# Patient Record
Sex: FEMALE | Race: WHITE | NOT HISPANIC OR LATINO | Employment: FULL TIME | URBAN - METROPOLITAN AREA
[De-identification: names, ages, dates, MRNs, and addresses within clinical notes are randomized per-mention and may not be internally consistent; named-entity substitution may affect disease eponyms.]

---

## 2017-03-18 ENCOUNTER — OFFICE VISIT (OUTPATIENT)
Dept: URGENT CARE | Facility: CLINIC | Age: 27
End: 2017-03-18
Payer: COMMERCIAL

## 2017-03-18 PROCEDURE — 99214 OFFICE O/P EST MOD 30 MIN: CPT

## 2017-03-30 ENCOUNTER — OFFICE VISIT (OUTPATIENT)
Dept: URGENT CARE | Facility: CLINIC | Age: 27
End: 2017-03-30
Payer: COMMERCIAL

## 2017-03-30 PROCEDURE — 99214 OFFICE O/P EST MOD 30 MIN: CPT

## 2018-02-26 ENCOUNTER — OFFICE VISIT (OUTPATIENT)
Dept: URGENT CARE | Facility: CLINIC | Age: 28
End: 2018-02-26
Payer: COMMERCIAL

## 2018-02-26 VITALS
HEART RATE: 96 BPM | RESPIRATION RATE: 18 BRPM | WEIGHT: 250 LBS | TEMPERATURE: 97.8 F | OXYGEN SATURATION: 99 % | DIASTOLIC BLOOD PRESSURE: 66 MMHG | HEIGHT: 69 IN | SYSTOLIC BLOOD PRESSURE: 142 MMHG | BODY MASS INDEX: 37.03 KG/M2

## 2018-02-26 DIAGNOSIS — N39.0 URINARY TRACT INFECTION WITHOUT HEMATURIA, SITE UNSPECIFIED: Primary | ICD-10-CM

## 2018-02-26 DIAGNOSIS — R35.0 FREQUENCY OF MICTURITION: ICD-10-CM

## 2018-02-26 LAB
SL AMB  POCT GLUCOSE, UA: ABNORMAL
SL AMB LEUKOCYTE ESTERASE,UA: ABNORMAL
SL AMB POCT BILIRUBIN,UA: ABNORMAL
SL AMB POCT BLOOD,UA: ABNORMAL
SL AMB POCT CLARITY,UA: CLEAR
SL AMB POCT COLOR,UA: YELLOW
SL AMB POCT KETONES,UA: ABNORMAL
SL AMB POCT NITRITE,UA: ABNORMAL
SL AMB POCT PH,UA: ABNORMAL
SL AMB POCT SPECIFIC GRAVITY,UA: 1
SL AMB POCT URINE PROTEIN: ABNORMAL
SL AMB POCT UROBILINOGEN: ABNORMAL

## 2018-02-26 PROCEDURE — 87086 URINE CULTURE/COLONY COUNT: CPT | Performed by: NURSE PRACTITIONER

## 2018-02-26 PROCEDURE — 81002 URINALYSIS NONAUTO W/O SCOPE: CPT | Performed by: NURSE PRACTITIONER

## 2018-02-26 PROCEDURE — 99213 OFFICE O/P EST LOW 20 MIN: CPT | Performed by: NURSE PRACTITIONER

## 2018-02-26 PROCEDURE — 87186 SC STD MICRODIL/AGAR DIL: CPT | Performed by: NURSE PRACTITIONER

## 2018-02-26 PROCEDURE — 87077 CULTURE AEROBIC IDENTIFY: CPT | Performed by: NURSE PRACTITIONER

## 2018-02-26 RX ORDER — SULFAMETHOXAZOLE AND TRIMETHOPRIM 800; 160 MG/1; MG/1
1 TABLET ORAL EVERY 12 HOURS SCHEDULED
Qty: 14 TABLET | Refills: 0 | Status: SHIPPED | OUTPATIENT
Start: 2018-02-26 | End: 2018-03-05

## 2018-02-26 NOTE — PATIENT INSTRUCTIONS
Dysuria   AMBULATORY CARE:   Dysuria  is trouble urinating, or pain, burning, or discomfort when you urinate  Dysuria is usually a symptom of another problem, such as a blockage or urinary tract infection  Common symptoms include the following:   · Fever     · Cloudy, bad smelling urine     · Urge to urinate often but urinating little     · Back, side, or abdominal pain     · Blood in your urine     · Discharge that smells bad     · Itching  Seek care immediately if:   · You have severe back, side, or abdominal pain  · You have fever and shaking chills  · You vomit several times in a row  Contact your healthcare provider if:   · Your symptoms do not go away, even after treatment  · You have questions or concerns about your condition or care  Treatment for dysuria  may include medicines to treat a bacterial infection or help decrease bladder spasms  Manage your dysuria:   · Drink more liquids  Liquids help flush out bacteria that may be causing an infection  Ask your healthcare provider how much liquid to drink each day and which liquids are best for you  · Take sitz baths as directed  Fill a bathtub with 4 to 6 inches of warm water  You may also use a sitz bath pan that fits over a toilet  Sit in the sitz bath for 20 minutes  Do this 2 to 3 times a day, or as directed  The warm water can help decrease pain and swelling  Follow up with your healthcare provider as directed:  Write down your questions so you remember to ask them during your visits  © 2017 [address] [employer] Information is for End User's use only and may not be sold, redistributed or otherwise used for commercial purposes  All illustrations and images included in CareNotes® are the copyrighted property of [employer] , [employer]  or [employer]  The above information is an  only  It is not intended as medical advice for individual conditions or treatments   Talk to your doctor, nurse or pharmacist before following any medical regimen to see if it is safe and effective for you

## 2018-02-26 NOTE — PROGRESS NOTES
[facility]        NAME: Maggie Britton is a 33 y o  female  : 1990    MRN: [id number]  DATE: 2018  TIME: 12:41 PM    Assessment and Plan   Urinary tract infection without hematuria, site unspecified [N39 0]  1  Urinary tract infection without hematuria, site unspecified  sulfamethoxazole-trimethoprim (BACTRIM DS) 800-160 mg per tablet   2  Frequency of micturition  POCT urine dip         Patient Instructions       Follow up with PCP in 3-5 days  Proceed to  ER if symptoms worsen  Chief Complaint     Chief Complaint   Patient presents with    Possible UTI     C/O frequency, lower abdominal pressure and pain with urination x 1 week  History of Present Illness       21-year-old female at urgent care with chief complaint of urinary frequency burning on urination and lower abdominal pain for 1 week along with urgency        Review of Systems   Review of Systems   Constitutional: Negative  HENT: Negative  Eyes: Negative  Respiratory: Negative  Cardiovascular: Negative  Gastrointestinal: Positive for abdominal pain  Endocrine: Negative  Genitourinary: Positive for dysuria and urgency  Musculoskeletal: Negative  Skin: Negative  Allergic/Immunologic: Negative  Neurological: Negative  Hematological: Negative  Psychiatric/Behavioral: Negative            Current Medications       Current Outpatient Prescriptions:     sulfamethoxazole-trimethoprim (BACTRIM DS) 800-160 mg per tablet, Take 1 tablet by mouth every 12 (twelve) hours for 7 days, Disp: 14 tablet, Rfl: 0    Current Allergies     Allergies as of 2018    (No Known Allergies)            The following portions of the patient's history were reviewed and updated as appropriate: allergies, current medications, past family history, past medical history, past social history, past surgical history and problem list      Past Medical History:   Diagnosis Date    Allergic rhinitis     Oral contraceptive use        History reviewed  No pertinent surgical history  No family history on file  Medications have been verified  Objective   /66   Pulse 96   Temp 97 8 °F (36 6 °C)   Resp 18   Ht 5' 9" (1 753 m)   Wt 113 kg (250 lb)   SpO2 99%   BMI 36 92 kg/m²        Physical Exam     Physical Exam   Constitutional: She is oriented to person, place, and time  Vital signs are normal  She appears well-developed and well-nourished  She is active and cooperative  HENT:   Head: Normocephalic and atraumatic  Right Ear: Hearing, tympanic membrane, external ear and ear canal normal    Left Ear: Hearing, tympanic membrane, external ear and ear canal normal    Nose: Nose normal    Mouth/Throat: Uvula is midline, oropharynx is clear and moist and mucous membranes are normal    Cardiovascular: Normal rate, regular rhythm and normal heart sounds  Pulmonary/Chest: Effort normal and breath sounds normal    Abdominal: Normal appearance and bowel sounds are normal  There is no hepatosplenomegaly, splenomegaly or hepatomegaly  There is no tenderness  There is no rigidity, no rebound, no guarding, no CVA tenderness, no tenderness at McBurney's point and negative Porter's sign  No hernia  Hernia confirmed negative in the ventral area, confirmed negative in the right inguinal area and confirmed negative in the left inguinal area  Musculoskeletal: Normal range of motion  Neurological: She is alert and oriented to person, place, and time  Skin: Skin is warm, dry and intact  Psychiatric: She has a normal mood and affect   Her speech is normal and behavior is normal  Judgment and thought content normal  Cognition and memory are normal

## 2018-02-28 LAB — BACTERIA UR CULT: ABNORMAL

## 2018-05-18 ENCOUNTER — OFFICE VISIT (OUTPATIENT)
Dept: URGENT CARE | Facility: CLINIC | Age: 28
End: 2018-05-18
Payer: COMMERCIAL

## 2018-05-18 VITALS
WEIGHT: 250 LBS | DIASTOLIC BLOOD PRESSURE: 76 MMHG | HEART RATE: 86 BPM | SYSTOLIC BLOOD PRESSURE: 122 MMHG | TEMPERATURE: 97.3 F | OXYGEN SATURATION: 99 % | RESPIRATION RATE: 20 BRPM | BODY MASS INDEX: 37.03 KG/M2 | HEIGHT: 69 IN

## 2018-05-18 DIAGNOSIS — R07.9 CHEST PAIN, UNSPECIFIED TYPE: Primary | ICD-10-CM

## 2018-05-18 LAB
ALBUMIN SERPL BCP-MCNC: 4.1 G/DL (ref 3.5–5.7)
ALP SERPL-CCNC: 30 IU/L (ref 40–150)
ALT SERPL W P-5'-P-CCNC: 21 IU/L (ref 0–50)
ANION GAP SERPL CALCULATED.3IONS-SCNC: 12.6 MM/L
APTT PPP: 27 SEC (ref 24.4–37.6)
AST SERPL W P-5'-P-CCNC: 14 U/L (ref 7–26)
BASOPHILS # BLD AUTO: 0 X3/UL (ref 0–0.3)
BASOPHILS # BLD AUTO: 0.5 % (ref 0–2)
BILIRUB SERPL-MCNC: 0.3 MG/DL (ref 0.3–1)
BUN SERPL-MCNC: 12 MG/DL (ref 7–25)
CALCIUM SERPL-MCNC: 9.4 MG/DL (ref 8.6–10.5)
CHLORIDE SERPL-SCNC: 107 MM/L (ref 98–107)
CO2 SERPL-SCNC: 26 MM/L (ref 21–31)
CREAT SERPL-MCNC: 0.85 MG/DL (ref 0.6–1.2)
D-DIMER QUANTITATIVE (HISTORICAL): < 150 NG/ML
DEPRECATED RDW RBC AUTO: 13.7 % (ref 11.5–14.5)
EGFR (HISTORICAL): > 60 GFR
EGFR AFRICAN AMERICAN (HISTORICAL): > 60 GFR
EOSINOPHIL # BLD AUTO: 0.1 X3/UL (ref 0–0.5)
EOSINOPHIL NFR BLD AUTO: 0.9 % (ref 0–5)
GLUCOSE (HISTORICAL): 85 MG/DL (ref 65–99)
HCT VFR BLD AUTO: 41.4 % (ref 37–47)
HGB BLD-MCNC: 13.5 G/DL (ref 12–16)
INR PPP: 0.94 (ref 0.9–1.5)
LYMPHOCYTES # BLD AUTO: 3.6 X3/UL (ref 1.2–4.2)
LYMPHOCYTES NFR BLD AUTO: 40.8 % (ref 20.5–51.1)
MCH RBC QN AUTO: 24.7 PG (ref 26–34)
MCHC RBC AUTO-ENTMCNC: 32.5 G/DL (ref 31–36)
MCV RBC AUTO: 76.1 FL (ref 81–99)
MONOCYTES # BLD AUTO: 0.5 X3/UL (ref 0–1)
MONOCYTES NFR BLD AUTO: 6 % (ref 1.7–12)
NEUTROPHILS # BLD AUTO: 4.6 X3/UL (ref 1.4–6.5)
NEUTS SEG NFR BLD AUTO: 51.8 % (ref 42.2–75.2)
OSMOLALITY, SERUM (HISTORICAL): 282 MOSM (ref 262–291)
PLATELET # BLD AUTO: 262 X3/UL (ref 130–400)
PMV BLD AUTO: 8 FL (ref 8.6–11.7)
POTASSIUM SERPL-SCNC: 3.6 MM/L (ref 3.5–5.5)
PROTHROMBIN TIME (HISTORICAL): 10.9 SEC (ref 10.1–12.9)
RBC # BLD AUTO: 5.44 X6/UL (ref 3.9–5.2)
SODIUM SERPL-SCNC: 142 MM/L (ref 134–143)
TOTAL PROTEIN (HISTORICAL): 6.5 G/DL (ref 6.4–8.9)
TROPONIN I SERPL-MCNC: < 0.03 NG/ML
WBC # BLD AUTO: 8.8 X3/UL (ref 4.8–10.8)

## 2018-05-18 PROCEDURE — 99213 OFFICE O/P EST LOW 20 MIN: CPT | Performed by: PHYSICIAN ASSISTANT

## 2018-05-18 NOTE — PATIENT INSTRUCTIONS
Go directly to [facility] emergency room for further evaluation

## 2018-05-18 NOTE — PROGRESS NOTES
[facility]        NAME: Parag Avila is a 33 y o  female  : 1990    MRN: [id number]  DATE: May 18, 2018  TIME: 11:25 AM    Assessment and Plan   Chest pain, unspecified type [R07 9]  1  Chest pain, unspecified type           Patient Instructions     Go to [facility] ER for further evaluation  Chief Complaint     Chief Complaint   Patient presents with    Chest Pain     C/O chest pain, tight in the chest and SOB x 8-9 days  Pt was seen 6 days ago in an Urgent Care and was treated with a Z-PREETHI and Prednisone without relief  Pt is on oral cotraceptives and denies any long distance travel  History of Present Illness       Patient was treated with a Z-Preethi and prednisone approximately 9 days ago for respiratory issues she is having continued shortness of breath on exertion and is now complaining of chest pain  Denies any fever or chills edema of lower extremities or calf pain  Patient does take oral contraceptives  Review of Systems   Review of Systems   Constitutional: Negative for chills and fever  HENT: Negative for congestion, postnasal drip, rhinorrhea, sinus pressure, sore throat and trouble swallowing  Eyes: Negative for redness  Respiratory: Positive for shortness of breath (on exertion)  Negative for cough, chest tightness and wheezing  Cardiovascular: Positive for chest pain  Negative for palpitations  Gastrointestinal: Negative for abdominal pain, diarrhea, nausea and vomiting  Musculoskeletal: Negative for myalgias  Skin: Negative for rash  Neurological: Negative for dizziness and headaches  Hematological: Negative for adenopathy  Current Medications     No current outpatient prescriptions on file      Current Allergies     Allergies as of 2018    (No Known Allergies)            The following portions of the patient's history were reviewed and updated as appropriate: allergies, current medications, past family history, past medical history, past social history, past surgical history and problem list      Past Medical History:   Diagnosis Date    Allergic rhinitis     Oral contraceptive use        History reviewed  No pertinent surgical history  No family history on file  Medications have been verified  Objective   /76   Pulse 86   Temp (!) 97 3 °F (36 3 °C) (Tympanic)   Resp 20   Ht 5' 9" (1 753 m)   Wt 113 kg (250 lb)   SpO2 99%   BMI 36 92 kg/m²        Physical Exam     Physical Exam   Constitutional: She is oriented to person, place, and time  She appears well-developed and well-nourished  HENT:   Head: Normocephalic and atraumatic  Right Ear: External ear normal    Left Ear: External ear normal    Nose: Nose normal    Mouth/Throat: Oropharynx is clear and moist    Eyes: Conjunctivae are normal    Neck: Neck supple  Cardiovascular: Normal rate, regular rhythm and normal heart sounds  Pulmonary/Chest: Effort normal and breath sounds normal    Musculoskeletal: Normal range of motion  Lymphadenopathy:     She has no cervical adenopathy  Neurological: She is alert and oriented to person, place, and time  Skin: Skin is warm and dry  No rash noted  Psychiatric: She has a normal mood and affect  Her behavior is normal  Judgment and thought content normal    Nursing note and vitals reviewed  Patient was ambulated with pulse oximeter on her pulse went from  and her oxygen saturation went from 100% down to 93% on room air  This in conjunction with a history of the oral birth control pills cannot rule out a pulmonary embolus patient was sent to the emergency room for further evaluation  Patient was advised that this would be a pulmonary embolus difficult and treated the embolus could get increase in size and lead to add on timely death  Patient did verbalized her understanding

## 2018-05-29 RX ORDER — DESOG-E.ESTRADIOL/E.ESTRADIOL 21-5 (28)
1 TABLET ORAL DAILY
COMMUNITY
Start: 2018-03-10 | End: 2018-09-19 | Stop reason: CLARIF

## 2018-05-30 ENCOUNTER — OFFICE VISIT (OUTPATIENT)
Dept: FAMILY MEDICINE CLINIC | Facility: CLINIC | Age: 28
End: 2018-05-30
Payer: COMMERCIAL

## 2018-05-30 VITALS
HEIGHT: 67 IN | TEMPERATURE: 99.8 F | WEIGHT: 274 LBS | HEART RATE: 100 BPM | OXYGEN SATURATION: 98 % | SYSTOLIC BLOOD PRESSURE: 100 MMHG | RESPIRATION RATE: 20 BRPM | BODY MASS INDEX: 43 KG/M2 | DIASTOLIC BLOOD PRESSURE: 72 MMHG

## 2018-05-30 DIAGNOSIS — Z13.220 SCREENING FOR HYPERLIPIDEMIA: ICD-10-CM

## 2018-05-30 DIAGNOSIS — Z00.00 ENCOUNTER FOR PREVENTATIVE ADULT HEALTH CARE EXAMINATION: ICD-10-CM

## 2018-05-30 DIAGNOSIS — M94.0 COSTOCHONDRITIS: Primary | ICD-10-CM

## 2018-05-30 DIAGNOSIS — E55.9 VITAMIN D DEFICIENCY: ICD-10-CM

## 2018-05-30 DIAGNOSIS — R53.83 OTHER FATIGUE: ICD-10-CM

## 2018-05-30 DIAGNOSIS — R06.00 DYSPNEA ON EXERTION: ICD-10-CM

## 2018-05-30 DIAGNOSIS — J30.9 ALLERGIC RHINITIS, UNSPECIFIED SEASONALITY, UNSPECIFIED TRIGGER: ICD-10-CM

## 2018-05-30 PROBLEM — R06.09 DYSPNEA ON EXERTION: Status: ACTIVE | Noted: 2018-05-30

## 2018-05-30 PROBLEM — J01.10 ACUTE FRONTAL SINUSITIS: Status: ACTIVE | Noted: 2017-03-30

## 2018-05-30 PROCEDURE — 99204 OFFICE O/P NEW MOD 45 MIN: CPT | Performed by: NURSE PRACTITIONER

## 2018-05-30 RX ORDER — NORETHINDRONE ACETATE AND ETHINYL ESTRADIOL AND FERROUS FUMARATE 1MG-20(24)
KIT ORAL
Refills: 0 | Status: CANCELLED | OUTPATIENT
Start: 2018-05-30

## 2018-05-30 RX ORDER — PREDNISONE 20 MG/1
40 TABLET ORAL
COMMUNITY
Start: 2018-05-14 | End: 2018-05-30 | Stop reason: ALTCHOICE

## 2018-05-30 RX ORDER — NORETHINDRONE ACETATE AND ETHINYL ESTRADIOL AND FERROUS FUMARATE 1MG-20(24)
KIT ORAL
COMMUNITY
Start: 2018-05-21 | End: 2018-05-30 | Stop reason: ALTCHOICE

## 2018-05-30 RX ORDER — IBUPROFEN 600 MG/1
TABLET ORAL
Refills: 2 | COMMUNITY
Start: 2018-05-18 | End: 2018-09-19 | Stop reason: ALTCHOICE

## 2018-05-30 NOTE — PROGRESS NOTES
Assessment/Plan:    No problem-specific Assessment & Plan notes found for this encounter  Diagnoses and all orders for this visit:    Allergic rhinitis, unspecified seasonality, unspecified trigger    Other orders  -     Nichol Rohit 0 15-0 02/0 01 MG (21/5) per tablet;   -     ibuprofen (MOTRIN) 600 mg tablet; TAKE 1 TABLET BY MOUTH 2 TO 3 TIMES A DAY  -     Discontinue: MELODETTA 24 FE 1-20 MG-MCG(24) CHEW;   -     Discontinue: predniSONE 20 mg tablet; Take 40 mg by mouth          Subjective:      Patient ID: Talat Abraham is a 33 y o  female  presents with c/o chest pain, was seen in [place] [address] and then subsequently sent to [facility] for chest pain and diagnosed with costocondritis 5/18/18  Pt reports midsternal reproduceable pain is much improved today with use of antiinflammatory medication     Pt denies cough, but does have low grade fever today  HPI    The following portions of the patient's history were reviewed and updated as appropriate:   She  has a past medical history of Allergic rhinitis; Obesity; and Oral contraceptive use  She   Patient Active Problem List    Diagnosis Date Noted    Allergic rhinitis 05/30/2018    Costochondritis 05/30/2018    Acute frontal sinusitis 03/30/2017     She  has a past surgical history that includes [place] tooth extraction  Her family history includes Diabetes in her father; Hypertension in her mother  She  reports that she has never smoked  She has never used smokeless tobacco  She reports that she drinks alcohol  She reports that she does not use drugs  Current Outpatient Prescriptions   Medication Sig Dispense Refill    ibuprofen (MOTRIN) 600 mg tablet TAKE 1 TABLET BY MOUTH 2 TO 3 TIMES A DAY  2    KARIVA 0 15-0 02/0 01 MG (21/5) per tablet        No current facility-administered medications for this visit  No current outpatient prescriptions on file prior to visit  No current facility-administered medications on file prior to visit        She has No Known Allergies       Review of Systems   Constitutional: Negative for fatigue  HENT: Negative for congestion, postnasal drip, rhinorrhea, sinus pain, sore throat and trouble swallowing  Eyes: Negative for pain and visual disturbance  Respiratory: Negative for cough, shortness of breath and wheezing  Cardiovascular: Negative for chest pain and palpitations  Gastrointestinal: Negative for constipation, diarrhea, nausea and vomiting  Endocrine: Negative for polydipsia, polyphagia and polyuria  Genitourinary: Negative for difficulty urinating, flank pain, frequency and pelvic pain  Musculoskeletal: Negative for arthralgias, back pain, joint swelling and myalgias  Skin: Negative  Negative for color change and rash  Neurological: Negative for dizziness, syncope, weakness, light-headedness, numbness and headaches  Hematological: Negative for adenopathy  Does not bruise/bleed easily  Psychiatric/Behavioral: Negative for behavioral problems and sleep disturbance  The patient is not nervous/anxious  Objective:      /72 (BP Location: Left arm, Patient Position: Sitting, Cuff Size: Large)   Pulse 100   Temp 100 1 °F (37 8 °C) (Tympanic)   Resp 20   Ht 5' 7 25" (1 708 m)   Wt 124 kg (274 lb)   LMP 05/04/2018 (Exact Date)   SpO2 98%   BMI 42 60 kg/m²          Physical Exam   Constitutional: She is oriented to person, place, and time  She appears well-developed and well-nourished  HENT:   Head: Normocephalic  Eyes: Pupils are equal, round, and reactive to light  Neck: Normal range of motion  Cardiovascular: Normal rate and regular rhythm  Pulmonary/Chest: Effort normal and breath sounds normal    Abdominal: Soft  Bowel sounds are normal    Musculoskeletal: Normal range of motion  Neurological: She is alert and oriented to person, place, and time  Skin: Skin is warm and dry  Psychiatric: She has a normal mood and affect   Her behavior is normal  Judgment and thought content normal    Nursing note and vitals reviewed

## 2018-05-30 NOTE — PATIENT INSTRUCTIONS
Costochondritis   WHAT YOU NEED TO KNOW:   Costochondritis is a condition that causes pain in the cartilage that connect your ribs to your sternum (breastbone)  Cartilage is the tough, bendable tissue that protects your bones  DISCHARGE INSTRUCTIONS:   Medicines:   · Acetaminophen: This medicine decreases pain  Acetaminophen is available without a doctor's order  Ask how much to take and how often to take it  Follow directions  Acetaminophen can cause liver damage if not taken correctly  · NSAIDs , such as ibuprofen, help decrease swelling, pain, and fever  This medicine is available with or without a doctor's order  NSAIDs can cause stomach bleeding or kidney problems in certain people  If you take blood thinner medicine, always ask if NSAIDs are safe for you  Always read the medicine label and follow directions  Do not give these medicines to children under 10months of age without direction from your child's healthcare provider  · Take your medicine as directed  Contact your healthcare provider if you think your medicine is not helping or if you have side effects  Tell him of her if you are allergic to any medicine  Keep a list of the medicines, vitamins, and herbs you take  Include the amounts, and when and why you take them  Bring the list or the pill bottles to follow-up visits  Carry your medicine list with you in case of an emergency  Follow up with your healthcare provider as directed:  Write down your questions so you remember to ask them during your visits  Rest:  You may need to get more rest  Learn which movements and activities cause pain, and avoid doing them  Do not carry objects, such as a purse or backpack, if this is painful  Avoid activities such as rowing and weightlifting until your pain decreases or goes away  Ask which activities are best for you to do while you recover  Heat:  Heat helps decrease pain in some patients   Apply heat on the area for 20 to 30 minutes every 2 hours for as many days as directed  Ice:  Ice helps decrease swelling and pain  Ice may also help prevent tissue damage  Use an ice pack, or put crushed ice in a plastic bag  Cover it with a towel and place it on the painful area for 15 to 20 minutes every hour or as directed  Stretching exercises:  Gentle stretching may help your symptoms   a doorway and put your hands on the door frame at the level of your ears or shoulders  Take 1 step forward and gently stretch your chest  Try this with your hands higher up on the doorway  Contact your healthcare provider if:   · You have a fever  · The painful areas of your chest look swollen, red, and feel warm to the touch  · You cannot sleep because of the pain  · You have questions or concerns about your condition or care  © 2017 [employer] [employer]  Information is for End User's use only and may not be sold, redistributed or otherwise used for commercial purposes  All illustrations and images included in CareNotes® are the copyrighted property of [employer]  or [address]  The above information is an  only  It is not intended as medical advice for individual conditions or treatments  Talk to your doctor, nurse or pharmacist before following any medical regimen to see if it is safe and effective for you

## 2018-06-11 ENCOUNTER — TRANSCRIBE ORDERS (OUTPATIENT)
Dept: LAB | Facility: CLINIC | Age: 28
End: 2018-06-11

## 2018-06-11 ENCOUNTER — APPOINTMENT (OUTPATIENT)
Dept: LAB | Facility: CLINIC | Age: 28
End: 2018-06-11
Payer: COMMERCIAL

## 2018-06-11 DIAGNOSIS — R06.00 DYSPNEA ON EXERTION: ICD-10-CM

## 2018-06-11 DIAGNOSIS — Z13.220 SCREENING FOR HYPERLIPIDEMIA: ICD-10-CM

## 2018-06-11 DIAGNOSIS — R53.83 OTHER FATIGUE: ICD-10-CM

## 2018-06-11 DIAGNOSIS — E55.9 VITAMIN D DEFICIENCY: ICD-10-CM

## 2018-06-11 LAB
25(OH)D3 SERPL-MCNC: 9.8 NG/ML (ref 30–100)
BACTERIA UR QL AUTO: ABNORMAL /HPF
BILIRUB UR QL STRIP: NEGATIVE
CHOLEST SERPL-MCNC: 191 MG/DL (ref 50–200)
CLARITY UR: ABNORMAL
COLOR UR: YELLOW
GLUCOSE UR STRIP-MCNC: NEGATIVE MG/DL
HDLC SERPL-MCNC: 54 MG/DL (ref 40–60)
HGB UR QL STRIP.AUTO: NEGATIVE
HYALINE CASTS #/AREA URNS LPF: ABNORMAL /LPF
KETONES UR STRIP-MCNC: ABNORMAL MG/DL
LDLC SERPL CALC-MCNC: 95 MG/DL (ref 0–100)
LEUKOCYTE ESTERASE UR QL STRIP: ABNORMAL
NITRITE UR QL STRIP: NEGATIVE
NON-SQ EPI CELLS URNS QL MICRO: ABNORMAL /HPF
NONHDLC SERPL-MCNC: 137 MG/DL
PH UR STRIP.AUTO: 6 [PH] (ref 4.5–8)
PROT UR STRIP-MCNC: NEGATIVE MG/DL
RBC #/AREA URNS AUTO: ABNORMAL /HPF
SP GR UR STRIP.AUTO: 1.01 (ref 1–1.03)
TRIGL SERPL-MCNC: 210 MG/DL
TSH SERPL DL<=0.05 MIU/L-ACNC: 0.87 UIU/ML (ref 0.36–3.74)
UROBILINOGEN UR QL STRIP.AUTO: 0.2 E.U./DL
WBC #/AREA URNS AUTO: ABNORMAL /HPF

## 2018-06-11 PROCEDURE — 84443 ASSAY THYROID STIM HORMONE: CPT

## 2018-06-11 PROCEDURE — 80061 LIPID PANEL: CPT

## 2018-06-11 PROCEDURE — 82306 VITAMIN D 25 HYDROXY: CPT

## 2018-06-11 PROCEDURE — 36415 COLL VENOUS BLD VENIPUNCTURE: CPT

## 2018-06-11 PROCEDURE — 81001 URINALYSIS AUTO W/SCOPE: CPT | Performed by: NURSE PRACTITIONER

## 2018-06-13 ENCOUNTER — TRANSCRIBE ORDERS (OUTPATIENT)
Dept: LAB | Facility: CLINIC | Age: 28
End: 2018-06-13

## 2018-06-13 ENCOUNTER — APPOINTMENT (OUTPATIENT)
Dept: LAB | Facility: CLINIC | Age: 28
End: 2018-06-13
Payer: COMMERCIAL

## 2018-06-13 ENCOUNTER — OFFICE VISIT (OUTPATIENT)
Dept: FAMILY MEDICINE CLINIC | Facility: CLINIC | Age: 28
End: 2018-06-13
Payer: COMMERCIAL

## 2018-06-13 VITALS
WEIGHT: 273.8 LBS | HEIGHT: 67 IN | RESPIRATION RATE: 20 BRPM | TEMPERATURE: 99.4 F | DIASTOLIC BLOOD PRESSURE: 74 MMHG | BODY MASS INDEX: 42.97 KG/M2 | SYSTOLIC BLOOD PRESSURE: 100 MMHG | HEART RATE: 100 BPM

## 2018-06-13 DIAGNOSIS — L65.9 HAIR LOSS: ICD-10-CM

## 2018-06-13 DIAGNOSIS — M94.0 COSTOCHONDRITIS: Primary | ICD-10-CM

## 2018-06-13 DIAGNOSIS — D56.9 THALASSEMIA, UNSPECIFIED TYPE: ICD-10-CM

## 2018-06-13 DIAGNOSIS — E55.9 VITAMIN D DEFICIENCY: ICD-10-CM

## 2018-06-13 DIAGNOSIS — N92.6 IRREGULAR MENSES: ICD-10-CM

## 2018-06-13 DIAGNOSIS — R06.00 DYSPNEA ON EXERTION: ICD-10-CM

## 2018-06-13 DIAGNOSIS — R73.9 HYPERGLYCEMIA: ICD-10-CM

## 2018-06-13 DIAGNOSIS — N39.0 URINARY TRACT INFECTION WITHOUT HEMATURIA, SITE UNSPECIFIED: ICD-10-CM

## 2018-06-13 DIAGNOSIS — E28.2 PCOS (POLYCYSTIC OVARIAN SYNDROME): ICD-10-CM

## 2018-06-13 DIAGNOSIS — A74.9 CHLAMYDIAL INFECTION: ICD-10-CM

## 2018-06-13 DIAGNOSIS — E78.2 MIXED HYPERLIPIDEMIA: ICD-10-CM

## 2018-06-13 LAB
BILIRUB UR QL STRIP: NEGATIVE
CLARITY UR: CLEAR
COLOR UR: YELLOW
GLUCOSE UR STRIP-MCNC: NEGATIVE MG/DL
HGB UR QL STRIP.AUTO: NEGATIVE
KETONES UR STRIP-MCNC: NEGATIVE MG/DL
LEUKOCYTE ESTERASE UR QL STRIP: NEGATIVE
NITRITE UR QL STRIP: NEGATIVE
PH UR STRIP.AUTO: 6 [PH] (ref 4.5–8)
PROT UR STRIP-MCNC: NEGATIVE MG/DL
SP GR UR STRIP.AUTO: 1.02 (ref 1–1.03)
UROBILINOGEN UR QL STRIP.AUTO: 0.2 E.U./DL

## 2018-06-13 PROCEDURE — 81003 URINALYSIS AUTO W/O SCOPE: CPT | Performed by: NURSE PRACTITIONER

## 2018-06-13 PROCEDURE — 99214 OFFICE O/P EST MOD 30 MIN: CPT | Performed by: NURSE PRACTITIONER

## 2018-06-13 RX ORDER — ERGOCALCIFEROL 1.25 MG/1
50000 CAPSULE ORAL WEEKLY
Qty: 12 CAPSULE | Refills: 1 | Status: SHIPPED | OUTPATIENT
Start: 2018-06-13 | End: 2018-11-24 | Stop reason: SDUPTHER

## 2018-06-13 NOTE — PROGRESS NOTES
Assessment/Plan:    No problem-specific Assessment & Plan notes found for this encounter  Diagnoses and all orders for this visit:    Costochondritis  Comments:  Pt reports its resolved    Dyspnea on exertion  Comments:  Resolved  Orders:  -     TIBC; Future  -     Vitamin B12; Future  -     Ferritin; Future    Vitamin D deficiency  Comments:  Vitamin D 9 8 Rx for vitamin D provided  Orders:  -     ergocalciferol (VITAMIN D2) 50,000 units; Take 1 capsule (50,000 Units total) by mouth once a week    Mixed hyperlipidemia  Comments:  Triglycerides 215 additional labwork ordered  Orders:  -     Comprehensive metabolic panel    Irregular menses  Comments:  US abd and pelvis    Orders:  -     CBC and differential; Future  -     Vitamin B12; Future  -     Ferritin; Future  -     US abdomen and pelvis with transvaginal; Future  -     Prolactin; Future  -     Androstenedione; Future  -     Cortisol Level, AM Specimen; Future    PCOS (polycystic ovarian syndrome)  Comments:  labwork ordere, US abd pelvis  Orders:  -     CBC and differential; Future  -     Vitamin B12; Future  -     Ferritin; Future  -     US abdomen and pelvis with transvaginal; Future  -     Prolactin; Future  -     Androstenedione; Future    Chlamydial infection  Comments:  Repeat test of cure ordered for 3 mos, pt and her boyfriend have been treated with antibiotic by GYN  Orders:  -     US abdomen and pelvis with transvaginal; Future  -     Chlamydia/GC amplified DNA by PCR; Future    Urinary tract infection without hematuria, site unspecified  Comments:  UA negative however urine culture positive for Klebseilla pneumonia  Orders:  -     UA w Reflex to Microscopic w Reflex to Culture  -     UA w Reflex to Microscopic w Reflex to Culture    Hyperglycemia  Comments:  Labwork ordered, awaiting [facility] records [facility]  Orders:  -     C-peptide;  Future    Hair loss  Comments:  Labwork ordered, awaiting health records from [facility]  Orders:  -     Cortisol Level, AM Specimen; Future    Thalassemia, unspecified type  Comments:  Pt has family hx of Thalasemia,Labwork ordered, awaiting health records from [facility]  Orders:  -     Thalassemia and Hemoglobinopathy Comp; Future          Subjective:      Patient ID: Talat Abraham is a 33 y o  female here for f/u chostonchondritis -pt reports she is feeling much better, but still feels fatigued has irregular menses, difficulty losing weight, BMI 42 88 hair loss  Will assess for PCOS  Urine culture returned positive for Klebsiella in the setting of negative UA  Will repeat  Pt also reports she has been dx with Chlamydia and was treated        HPI    The following portions of the patient's history were reviewed and updated as appropriate:   She  has a past medical history of Allergic rhinitis; Obesity; and Oral contraceptive use  She   Patient Active Problem List    Diagnosis Date Noted    Allergic rhinitis 05/30/2018    Costochondritis 05/30/2018    Dyspnea on exertion 05/30/2018    Acute frontal sinusitis 03/30/2017     She  has a past surgical history that includes [place] tooth extraction  Her family history includes Diabetes in her father; Hypertension in her mother  She  reports that she has never smoked  She has never used smokeless tobacco  She reports that she drinks alcohol  She reports that she does not use drugs  Current Outpatient Prescriptions   Medication Sig Dispense Refill    ibuprofen (MOTRIN) 600 mg tablet TAKE 1 TABLET BY MOUTH 2 TO 3 TIMES A DAY  2    KARIVA 0 15-0 02/0 01 MG (21/5) per tablet Take 1 tablet by mouth daily        ergocalciferol (VITAMIN D2) 50,000 units Take 1 capsule (50,000 Units total) by mouth once a week 12 capsule 1     No current facility-administered medications for this visit        Current Outpatient Prescriptions on File Prior to Visit   Medication Sig    ibuprofen (MOTRIN) 600 mg tablet TAKE 1 TABLET BY MOUTH 2 TO 3 TIMES A DAY    KARIVA 0 15-0 02/0 01 MG (21/5) per tablet Take 1 tablet by mouth daily       No current facility-administered medications on file prior to visit  She has No Known Allergies       Review of Systems   Constitutional: Positive for fatigue  Hair loss     HENT: Negative for congestion, postnasal drip, rhinorrhea, sinus pain, sore throat and trouble swallowing  Eyes: Negative for pain and visual disturbance  Respiratory: Negative for cough, shortness of breath and wheezing  Cardiovascular: Negative for chest pain and palpitations  Gastrointestinal: Negative for constipation, diarrhea, nausea and vomiting  Endocrine: Negative for polydipsia, polyphagia and polyuria  Genitourinary: Positive for menstrual problem  Negative for difficulty urinating, flank pain, frequency and pelvic pain  Musculoskeletal: Negative for arthralgias, back pain, joint swelling and myalgias  Skin: Negative  Negative for color change and rash  Neurological: Negative for dizziness, syncope, weakness, light-headedness, numbness and headaches  Hematological: Negative for adenopathy  Does not bruise/bleed easily  Psychiatric/Behavioral: Negative for behavioral problems and sleep disturbance  The patient is not nervous/anxious  Objective:      /74 (BP Location: Left arm, Patient Position: Sitting, Cuff Size: Large)   Pulse 100   Temp 99 4 °F (37 4 °C) (Tympanic)   Resp 20   Ht 5' 7" (1 702 m)   Wt 124 kg (273 lb 12 8 oz)   LMP 05/23/2018 (Exact Date)   BMI 42 88 kg/m²          Physical Exam   Constitutional: She is oriented to person, place, and time  She appears well-developed and well-nourished  HENT:   Head: Normocephalic  Eyes: Pupils are equal, round, and reactive to light  Neck: Normal range of motion  Cardiovascular: Normal rate and regular rhythm  Pulmonary/Chest: Effort normal and breath sounds normal    Abdominal: Soft  Bowel sounds are normal    Musculoskeletal: Normal range of motion  Neurological: She is alert and oriented to person, place, and time  Skin: Skin is warm and dry  Psychiatric: She has a normal mood and affect  Her behavior is normal  Judgment and thought content normal    Nursing note and vitals reviewed

## 2018-07-10 ENCOUNTER — APPOINTMENT (OUTPATIENT)
Dept: LAB | Facility: CLINIC | Age: 28
End: 2018-07-10
Payer: COMMERCIAL

## 2018-07-10 ENCOUNTER — TRANSCRIBE ORDERS (OUTPATIENT)
Dept: LAB | Facility: CLINIC | Age: 28
End: 2018-07-10

## 2018-07-10 DIAGNOSIS — R06.00 DYSPNEA ON EXERTION: ICD-10-CM

## 2018-07-10 DIAGNOSIS — E28.2 PCOS (POLYCYSTIC OVARIAN SYNDROME): ICD-10-CM

## 2018-07-10 DIAGNOSIS — D56.9 THALASSEMIA SYNDROME: Primary | ICD-10-CM

## 2018-07-10 DIAGNOSIS — N92.6 IRREGULAR MENSES: ICD-10-CM

## 2018-07-10 DIAGNOSIS — R73.9 HYPERGLYCEMIA: ICD-10-CM

## 2018-07-10 DIAGNOSIS — A74.9 CHLAMYDIAL INFECTION: ICD-10-CM

## 2018-07-10 DIAGNOSIS — D56.9 THALASSEMIA, UNSPECIFIED TYPE: ICD-10-CM

## 2018-07-10 DIAGNOSIS — L65.9 HAIR LOSS: ICD-10-CM

## 2018-07-10 LAB
ALBUMIN SERPL BCP-MCNC: 3.8 G/DL (ref 3.5–5)
ALP SERPL-CCNC: 39 U/L (ref 46–116)
ALT SERPL W P-5'-P-CCNC: 24 U/L (ref 12–78)
ANION GAP SERPL CALCULATED.3IONS-SCNC: 8 MMOL/L (ref 4–13)
AST SERPL W P-5'-P-CCNC: 19 U/L (ref 5–45)
BASOPHILS # BLD AUTO: 0.04 THOUSANDS/ΜL (ref 0–0.1)
BASOPHILS NFR BLD AUTO: 1 % (ref 0–1)
BILIRUB SERPL-MCNC: 0.59 MG/DL (ref 0.2–1)
BILIRUB UR QL STRIP: NEGATIVE
BUN SERPL-MCNC: 10 MG/DL (ref 5–25)
CALCIUM SERPL-MCNC: 9.2 MG/DL (ref 8.3–10.1)
CHLORIDE SERPL-SCNC: 106 MMOL/L (ref 100–108)
CLARITY UR: CLEAR
CO2 SERPL-SCNC: 25 MMOL/L (ref 21–32)
COLOR UR: YELLOW
CORTIS AM PEAK SERPL-MCNC: 18.1 UG/DL (ref 4.2–22.4)
CREAT SERPL-MCNC: 0.83 MG/DL (ref 0.6–1.3)
EOSINOPHIL # BLD AUTO: 0.14 THOUSAND/ΜL (ref 0–0.61)
EOSINOPHIL NFR BLD AUTO: 3 % (ref 0–6)
ERYTHROCYTE [DISTWIDTH] IN BLOOD BY AUTOMATED COUNT: 13.8 % (ref 11.6–15.1)
FERRITIN SERPL-MCNC: 36 NG/ML (ref 8–388)
GFR SERPL CREATININE-BSD FRML MDRD: 97 ML/MIN/1.73SQ M
GLUCOSE P FAST SERPL-MCNC: 76 MG/DL (ref 65–99)
GLUCOSE UR STRIP-MCNC: NEGATIVE MG/DL
HCT VFR BLD AUTO: 44.4 % (ref 34.8–46.1)
HGB BLD-MCNC: 13.6 G/DL (ref 11.5–15.4)
HGB UR QL STRIP.AUTO: NEGATIVE
IMM GRANULOCYTES # BLD AUTO: 0.01 THOUSAND/UL (ref 0–0.2)
IMM GRANULOCYTES NFR BLD AUTO: 0 % (ref 0–2)
KETONES UR STRIP-MCNC: NEGATIVE MG/DL
LEUKOCYTE ESTERASE UR QL STRIP: NEGATIVE
LYMPHOCYTES # BLD AUTO: 2.29 THOUSANDS/ΜL (ref 0.6–4.47)
LYMPHOCYTES NFR BLD AUTO: 41 % (ref 14–44)
MCH RBC QN AUTO: 25 PG (ref 26.8–34.3)
MCHC RBC AUTO-ENTMCNC: 30.6 G/DL (ref 31.4–37.4)
MCV RBC AUTO: 82 FL (ref 82–98)
MONOCYTES # BLD AUTO: 0.46 THOUSAND/ΜL (ref 0.17–1.22)
MONOCYTES NFR BLD AUTO: 8 % (ref 4–12)
NEUTROPHILS # BLD AUTO: 2.65 THOUSANDS/ΜL (ref 1.85–7.62)
NEUTS SEG NFR BLD AUTO: 47 % (ref 43–75)
NITRITE UR QL STRIP: NEGATIVE
NRBC BLD AUTO-RTO: 0 /100 WBCS
PH UR STRIP.AUTO: 6 [PH] (ref 4.5–8)
PLATELET # BLD AUTO: 267 THOUSANDS/UL (ref 149–390)
PMV BLD AUTO: 10.6 FL (ref 8.9–12.7)
POTASSIUM SERPL-SCNC: 3.9 MMOL/L (ref 3.5–5.3)
PROLACTIN SERPL-MCNC: 18.6 NG/ML
PROT SERPL-MCNC: 7.4 G/DL (ref 6.4–8.2)
PROT UR STRIP-MCNC: NEGATIVE MG/DL
RBC # BLD AUTO: 5.43 MILLION/UL (ref 3.81–5.12)
SODIUM SERPL-SCNC: 139 MMOL/L (ref 136–145)
SP GR UR STRIP.AUTO: 1.01 (ref 1–1.03)
TIBC SERPL-MCNC: 382 UG/DL (ref 250–450)
UROBILINOGEN UR QL STRIP.AUTO: 0.2 E.U./DL
VIT B12 SERPL-MCNC: 296 PG/ML (ref 100–900)
WBC # BLD AUTO: 5.59 THOUSAND/UL (ref 4.31–10.16)

## 2018-07-10 PROCEDURE — 85025 COMPLETE CBC W/AUTO DIFF WBC: CPT

## 2018-07-10 PROCEDURE — 82533 TOTAL CORTISOL: CPT

## 2018-07-10 PROCEDURE — 83550 IRON BINDING TEST: CPT

## 2018-07-10 PROCEDURE — 87591 N.GONORRHOEAE DNA AMP PROB: CPT

## 2018-07-10 PROCEDURE — 82157 ASSAY OF ANDROSTENEDIONE: CPT

## 2018-07-10 PROCEDURE — 84146 ASSAY OF PROLACTIN: CPT

## 2018-07-10 PROCEDURE — 83020 HEMOGLOBIN ELECTROPHORESIS: CPT

## 2018-07-10 PROCEDURE — 84681 ASSAY OF C-PEPTIDE: CPT

## 2018-07-10 PROCEDURE — 82728 ASSAY OF FERRITIN: CPT

## 2018-07-10 PROCEDURE — 81003 URINALYSIS AUTO W/O SCOPE: CPT | Performed by: NURSE PRACTITIONER

## 2018-07-10 PROCEDURE — 82607 VITAMIN B-12: CPT

## 2018-07-10 PROCEDURE — 87491 CHLMYD TRACH DNA AMP PROBE: CPT

## 2018-07-10 PROCEDURE — 36415 COLL VENOUS BLD VENIPUNCTURE: CPT | Performed by: NURSE PRACTITIONER

## 2018-07-10 PROCEDURE — 80053 COMPREHEN METABOLIC PANEL: CPT | Performed by: NURSE PRACTITIONER

## 2018-07-11 LAB
CHLAMYDIA DNA CVX QL NAA+PROBE: NORMAL
N GONORRHOEA DNA GENITAL QL NAA+PROBE: NORMAL

## 2018-07-12 LAB
ANDROST SERPL-MCNC: 64 NG/DL (ref 41–262)
C PEPTIDE SERPL-MCNC: 3.4 NG/ML (ref 1.1–4.4)
DEPRECATED HGB OTHER BLD-IMP: 0 %
HGB A MFR BLD: 2.4 % (ref 1.8–3.2)
HGB A MFR BLD: 97.6 % (ref 96.4–98.8)
HGB C MFR BLD: 0 %
HGB F MFR BLD: 0 % (ref 0–2)
HGB FRACT BLD-IMP: NORMAL
HGB S BLD QL SOLY: NEGATIVE
HGB S MFR BLD: 0 %

## 2018-07-14 ENCOUNTER — HOSPITAL ENCOUNTER (OUTPATIENT)
Dept: ULTRASOUND IMAGING | Facility: HOSPITAL | Age: 28
Discharge: HOME | End: 2018-07-14
Payer: COMMERCIAL

## 2018-07-14 DIAGNOSIS — N92.6 IRREGULAR MENSES: ICD-10-CM

## 2018-07-14 DIAGNOSIS — E28.2 PCOS (POLYCYSTIC OVARIAN SYNDROME): ICD-10-CM

## 2018-07-14 DIAGNOSIS — A74.9 CHLAMYDIAL INFECTION: ICD-10-CM

## 2018-07-14 PROCEDURE — 76856 US EXAM PELVIC COMPLETE: CPT

## 2018-07-14 PROCEDURE — 76830 TRANSVAGINAL US NON-OB: CPT

## 2018-09-17 RX ORDER — NORETHINDRONE ACETATE AND ETHINYL ESTRADIOL AND FERROUS FUMARATE 1MG-20(24)
1 KIT ORAL DAILY
Refills: 12 | COMMUNITY
Start: 2018-09-11 | End: 2020-05-26 | Stop reason: SDUPTHER

## 2018-09-19 ENCOUNTER — OFFICE VISIT (OUTPATIENT)
Dept: FAMILY MEDICINE CLINIC | Facility: CLINIC | Age: 28
End: 2018-09-19
Payer: COMMERCIAL

## 2018-09-19 VITALS
HEIGHT: 67 IN | HEART RATE: 80 BPM | BODY MASS INDEX: 43.32 KG/M2 | DIASTOLIC BLOOD PRESSURE: 78 MMHG | RESPIRATION RATE: 16 BRPM | TEMPERATURE: 97.4 F | WEIGHT: 276 LBS | SYSTOLIC BLOOD PRESSURE: 110 MMHG

## 2018-09-19 DIAGNOSIS — J30.9 ALLERGIC RHINITIS, UNSPECIFIED SEASONALITY, UNSPECIFIED TRIGGER: Primary | ICD-10-CM

## 2018-09-19 DIAGNOSIS — Z23 NEED FOR INFLUENZA VACCINATION: ICD-10-CM

## 2018-09-19 DIAGNOSIS — N92.6 ABNORMAL MENSES: ICD-10-CM

## 2018-09-19 PROBLEM — E66.9 OBESITY: Status: ACTIVE | Noted: 2018-09-19

## 2018-09-19 PROCEDURE — 3008F BODY MASS INDEX DOCD: CPT | Performed by: NURSE PRACTITIONER

## 2018-09-19 PROCEDURE — 99213 OFFICE O/P EST LOW 20 MIN: CPT | Performed by: NURSE PRACTITIONER

## 2018-09-19 PROCEDURE — 90471 IMMUNIZATION ADMIN: CPT | Performed by: NURSE PRACTITIONER

## 2018-09-19 PROCEDURE — 90686 IIV4 VACC NO PRSV 0.5 ML IM: CPT | Performed by: NURSE PRACTITIONER

## 2018-09-19 PROCEDURE — 1036F TOBACCO NON-USER: CPT | Performed by: NURSE PRACTITIONER

## 2018-09-19 NOTE — PROGRESS NOTES
Assessment/Plan:    No problem-specific Assessment & Plan notes found for this encounter  Diagnoses and all orders for this visit:    Allergic rhinitis, unspecified seasonality, unspecified trigger  Comments:  Pt is using OTC sinus medication    Abnormal menses  Comments:  Pt reports normal menses with use of Melodetta    Need for influenza vaccination  -     SYRINGE/SINGLE-DOSE VIAL: influenza vaccine, [id number], quadrivalent, 0 5 mL, preservative-free, for patients 3+ yr (FLUZONE)    Other orders  -     MELODETTA 24 FE 1-20 MG-MCG(24) CHEW; Chew 1 tablet daily Chew          Subjective:      Patient ID: Ana Lilia Gonsalves is a 33 y o  female  Here for lab review and f/u on abnormal menses  Pt reports she has been started on Melodetta 24 and menses are improving and regulating  Pt is moving to [place] for employment    HPI    The following portions of the patient's history were reviewed and updated as appropriate:   She  has a past medical history of Allergic rhinitis; Obesity; and Oral contraceptive use  She   Patient Active Problem List    Diagnosis Date Noted    Obesity 09/19/2018    Abnormal menses 09/19/2018    Allergic rhinitis 05/30/2018    Costochondritis 05/30/2018    Dyspnea on exertion 05/30/2018    Acute frontal sinusitis 03/30/2017     She  has a past surgical history that includes [place] tooth extraction  Her family history includes Diabetes in her father; Hypertension in her mother  She  reports that she has never smoked  She has never used smokeless tobacco  She reports that she drinks alcohol  She reports that she does not use drugs  Current Outpatient Prescriptions   Medication Sig Dispense Refill    ergocalciferol (VITAMIN D2) 50,000 units Take 1 capsule (50,000 Units total) by mouth once a week 12 capsule 1    MELODETTA 24 FE 1-20 MG-MCG(24) CHEW Chew 1 tablet daily Chew  12     No current facility-administered medications for this visit        Current Outpatient Prescriptions on File Prior to Visit   Medication Sig    ergocalciferol (VITAMIN D2) 50,000 units Take 1 capsule (50,000 Units total) by mouth once a week    [DISCONTINUED] ibuprofen (MOTRIN) 600 mg tablet TAKE 1 TABLET BY MOUTH 2 TO 3 TIMES A DAY    [DISCONTINUED] KARIVA 0 15-0 02/0 01 MG (21/5) per tablet Take 1 tablet by mouth daily       No current facility-administered medications on file prior to visit  She is allergic to pollen extract       Review of Systems   Constitutional: Negative for fatigue  HENT: Negative for congestion, postnasal drip, rhinorrhea, sinus pain, sore throat and trouble swallowing  Eyes: Negative for pain and visual disturbance  Respiratory: Negative for cough, shortness of breath and wheezing  Cardiovascular: Negative for chest pain and palpitations  Gastrointestinal: Negative for constipation, diarrhea, nausea and vomiting  Endocrine: Negative for polydipsia, polyphagia and polyuria  Genitourinary: Positive for menstrual problem  Negative for difficulty urinating, flank pain, frequency and pelvic pain  Musculoskeletal: Negative for arthralgias, back pain, joint swelling and myalgias  Skin: Negative  Negative for color change and rash  Neurological: Negative for dizziness, syncope, weakness, light-headedness, numbness and headaches  Hematological: Negative for adenopathy  Does not bruise/bleed easily  Psychiatric/Behavioral: Negative for behavioral problems and sleep disturbance  The patient is not nervous/anxious  Objective:      /78 (BP Location: Left arm, Patient Position: Sitting, Cuff Size: Large)   Pulse 80   Temp (!) 97 4 °F (36 3 °C) (Tympanic)   Resp 16   Ht 5' 7" (1 702 m)   Wt 125 kg (276 lb)   LMP 09/17/2018 (Exact Date)   BMI 43 23 kg/m²          Physical Exam   Constitutional: She is oriented to person, place, and time  She appears well-developed and well-nourished  HENT:   Head: Normocephalic     Nose: Rhinorrhea present  Eyes: Pupils are equal, round, and reactive to light  Neck: Normal range of motion  Cardiovascular: Normal rate and regular rhythm  Pulmonary/Chest: Effort normal and breath sounds normal    Abdominal: Soft  Bowel sounds are normal    Musculoskeletal: Normal range of motion  Neurological: She is alert and oriented to person, place, and time  Skin: Skin is warm and dry  Psychiatric: She has a normal mood and affect  Her behavior is normal  Judgment and thought content normal    Nursing note and vitals reviewed

## 2018-11-24 DIAGNOSIS — E55.9 VITAMIN D DEFICIENCY: ICD-10-CM

## 2018-11-26 RX ORDER — ERGOCALCIFEROL 1.25 MG/1
CAPSULE ORAL
Qty: 12 CAPSULE | Refills: 1 | Status: SHIPPED | OUTPATIENT
Start: 2018-11-26 | End: 2019-05-29 | Stop reason: ALTCHOICE

## 2019-05-29 ENCOUNTER — OFFICE VISIT (OUTPATIENT)
Dept: FAMILY MEDICINE CLINIC | Facility: CLINIC | Age: 29
End: 2019-05-29
Payer: COMMERCIAL

## 2019-05-29 VITALS
WEIGHT: 279.2 LBS | RESPIRATION RATE: 20 BRPM | HEIGHT: 67 IN | TEMPERATURE: 98.7 F | BODY MASS INDEX: 43.82 KG/M2 | SYSTOLIC BLOOD PRESSURE: 110 MMHG | DIASTOLIC BLOOD PRESSURE: 84 MMHG | HEART RATE: 88 BPM

## 2019-05-29 DIAGNOSIS — M21.41 PES PLANUS OF RIGHT FOOT: ICD-10-CM

## 2019-05-29 DIAGNOSIS — Z13.0 SCREENING FOR DEFICIENCY ANEMIA: Primary | ICD-10-CM

## 2019-05-29 DIAGNOSIS — Z13.29 SCREENING FOR THYROID DISORDER: ICD-10-CM

## 2019-05-29 DIAGNOSIS — M79.673 FOOT ARCH PAIN, UNSPECIFIED LATERALITY: ICD-10-CM

## 2019-05-29 DIAGNOSIS — M21.42 PES PLANUS OF LEFT FOOT: ICD-10-CM

## 2019-05-29 DIAGNOSIS — Z13.1 SCREENING FOR DIABETES MELLITUS: ICD-10-CM

## 2019-05-29 DIAGNOSIS — Z13.220 SCREENING FOR HYPERLIPIDEMIA: ICD-10-CM

## 2019-05-29 DIAGNOSIS — B07.0 PLANTAR WART OF RIGHT FOOT: ICD-10-CM

## 2019-05-29 DIAGNOSIS — E55.9 VITAMIN D DEFICIENCY: ICD-10-CM

## 2019-05-29 DIAGNOSIS — E66.01 CLASS 3 SEVERE OBESITY DUE TO EXCESS CALORIES WITHOUT SERIOUS COMORBIDITY WITH BODY MASS INDEX (BMI) OF 40.0 TO 44.9 IN ADULT (HCC): Chronic | ICD-10-CM

## 2019-05-29 PROCEDURE — 1036F TOBACCO NON-USER: CPT | Performed by: NURSE PRACTITIONER

## 2019-05-29 PROCEDURE — 99213 OFFICE O/P EST LOW 20 MIN: CPT | Performed by: NURSE PRACTITIONER

## 2019-05-29 PROCEDURE — 3008F BODY MASS INDEX DOCD: CPT | Performed by: NURSE PRACTITIONER

## 2019-06-05 ENCOUNTER — TRANSCRIBE ORDERS (OUTPATIENT)
Dept: LAB | Facility: CLINIC | Age: 29
End: 2019-06-05

## 2019-06-05 ENCOUNTER — LAB (OUTPATIENT)
Dept: LAB | Facility: CLINIC | Age: 29
End: 2019-06-05
Payer: COMMERCIAL

## 2019-06-05 DIAGNOSIS — E55.9 VITAMIN D DEFICIENCY: ICD-10-CM

## 2019-06-05 DIAGNOSIS — Z13.0 SCREENING FOR DEFICIENCY ANEMIA: ICD-10-CM

## 2019-06-05 DIAGNOSIS — Z13.1 SCREENING FOR DIABETES MELLITUS: ICD-10-CM

## 2019-06-05 DIAGNOSIS — Z13.220 SCREENING FOR HYPERLIPIDEMIA: ICD-10-CM

## 2019-06-05 DIAGNOSIS — Z13.29 SCREENING FOR THYROID DISORDER: ICD-10-CM

## 2019-06-05 LAB
25(OH)D3 SERPL-MCNC: 22.2 NG/ML (ref 30–100)
ALBUMIN SERPL BCP-MCNC: 3.7 G/DL (ref 3.5–5)
ALP SERPL-CCNC: 42 U/L (ref 46–116)
ALT SERPL W P-5'-P-CCNC: 24 U/L (ref 12–78)
ANION GAP SERPL CALCULATED.3IONS-SCNC: 6 MMOL/L (ref 4–13)
AST SERPL W P-5'-P-CCNC: 16 U/L (ref 5–45)
BASOPHILS # BLD AUTO: 0.05 THOUSANDS/ΜL (ref 0–0.1)
BASOPHILS NFR BLD AUTO: 1 % (ref 0–1)
BILIRUB SERPL-MCNC: 0.32 MG/DL (ref 0.2–1)
BUN SERPL-MCNC: 9 MG/DL (ref 5–25)
CALCIUM SERPL-MCNC: 8.8 MG/DL (ref 8.3–10.1)
CHLORIDE SERPL-SCNC: 106 MMOL/L (ref 100–108)
CHOLEST SERPL-MCNC: 141 MG/DL (ref 50–200)
CO2 SERPL-SCNC: 25 MMOL/L (ref 21–32)
CREAT SERPL-MCNC: 0.91 MG/DL (ref 0.6–1.3)
EOSINOPHIL # BLD AUTO: 0.09 THOUSAND/ΜL (ref 0–0.61)
EOSINOPHIL NFR BLD AUTO: 1 % (ref 0–6)
ERYTHROCYTE [DISTWIDTH] IN BLOOD BY AUTOMATED COUNT: 13.8 % (ref 11.6–15.1)
GFR SERPL CREATININE-BSD FRML MDRD: 86 ML/MIN/1.73SQ M
GLUCOSE P FAST SERPL-MCNC: 68 MG/DL (ref 65–99)
HCT VFR BLD AUTO: 45 % (ref 34.8–46.1)
HDLC SERPL-MCNC: 49 MG/DL (ref 40–60)
HGB BLD-MCNC: 13.8 G/DL (ref 11.5–15.4)
IMM GRANULOCYTES # BLD AUTO: 0.02 THOUSAND/UL (ref 0–0.2)
IMM GRANULOCYTES NFR BLD AUTO: 0 % (ref 0–2)
LDLC SERPL CALC-MCNC: 55 MG/DL (ref 0–100)
LYMPHOCYTES # BLD AUTO: 2.09 THOUSANDS/ΜL (ref 0.6–4.47)
LYMPHOCYTES NFR BLD AUTO: 32 % (ref 14–44)
MCH RBC QN AUTO: 25.3 PG (ref 26.8–34.3)
MCHC RBC AUTO-ENTMCNC: 30.7 G/DL (ref 31.4–37.4)
MCV RBC AUTO: 82 FL (ref 82–98)
MONOCYTES # BLD AUTO: 0.46 THOUSAND/ΜL (ref 0.17–1.22)
MONOCYTES NFR BLD AUTO: 7 % (ref 4–12)
NEUTROPHILS # BLD AUTO: 3.89 THOUSANDS/ΜL (ref 1.85–7.62)
NEUTS SEG NFR BLD AUTO: 59 % (ref 43–75)
NONHDLC SERPL-MCNC: 92 MG/DL
NRBC BLD AUTO-RTO: 0 /100 WBCS
PLATELET # BLD AUTO: 260 THOUSANDS/UL (ref 149–390)
PMV BLD AUTO: 10.7 FL (ref 8.9–12.7)
POTASSIUM SERPL-SCNC: 3.9 MMOL/L (ref 3.5–5.3)
PROT SERPL-MCNC: 7.2 G/DL (ref 6.4–8.2)
RBC # BLD AUTO: 5.46 MILLION/UL (ref 3.81–5.12)
SODIUM SERPL-SCNC: 137 MMOL/L (ref 136–145)
TRIGL SERPL-MCNC: 184 MG/DL
TSH SERPL DL<=0.05 MIU/L-ACNC: 0.96 UIU/ML (ref 0.36–3.74)
WBC # BLD AUTO: 6.6 THOUSAND/UL (ref 4.31–10.16)

## 2019-06-05 PROCEDURE — 80053 COMPREHEN METABOLIC PANEL: CPT

## 2019-06-05 PROCEDURE — 84443 ASSAY THYROID STIM HORMONE: CPT

## 2019-06-05 PROCEDURE — 80061 LIPID PANEL: CPT

## 2019-06-05 PROCEDURE — 36415 COLL VENOUS BLD VENIPUNCTURE: CPT

## 2019-06-05 PROCEDURE — 85025 COMPLETE CBC W/AUTO DIFF WBC: CPT

## 2019-06-05 PROCEDURE — 82306 VITAMIN D 25 HYDROXY: CPT

## 2019-08-07 DIAGNOSIS — E55.9 VITAMIN D DEFICIENCY: Primary | ICD-10-CM

## 2019-08-07 RX ORDER — ERGOCALCIFEROL 1.25 MG/1
CAPSULE ORAL
Qty: 12 CAPSULE | Refills: 0 | Status: SHIPPED | OUTPATIENT
Start: 2019-08-07 | End: 2019-08-22 | Stop reason: SDUPTHER

## 2019-08-22 DIAGNOSIS — E55.9 VITAMIN D DEFICIENCY: ICD-10-CM

## 2019-08-22 RX ORDER — ERGOCALCIFEROL 1.25 MG/1
50000 CAPSULE ORAL WEEKLY
Qty: 12 CAPSULE | Refills: 1 | Status: SHIPPED | OUTPATIENT
Start: 2019-08-22 | End: 2019-11-13 | Stop reason: SDUPTHER

## 2019-09-24 ENCOUNTER — OFFICE VISIT (OUTPATIENT)
Dept: URGENT CARE | Facility: CLINIC | Age: 29
End: 2019-09-24
Payer: COMMERCIAL

## 2019-09-24 VITALS
HEIGHT: 67 IN | HEART RATE: 96 BPM | RESPIRATION RATE: 18 BRPM | WEIGHT: 279 LBS | BODY MASS INDEX: 43.79 KG/M2 | DIASTOLIC BLOOD PRESSURE: 76 MMHG | OXYGEN SATURATION: 96 % | SYSTOLIC BLOOD PRESSURE: 108 MMHG | TEMPERATURE: 97.7 F

## 2019-09-24 DIAGNOSIS — J01.90 ACUTE SINUSITIS, RECURRENCE NOT SPECIFIED, UNSPECIFIED LOCATION: Primary | ICD-10-CM

## 2019-09-24 PROCEDURE — 99213 OFFICE O/P EST LOW 20 MIN: CPT | Performed by: PHYSICIAN ASSISTANT

## 2019-09-24 RX ORDER — AMOXICILLIN AND CLAVULANATE POTASSIUM 875; 125 MG/1; MG/1
1 TABLET, FILM COATED ORAL EVERY 12 HOURS SCHEDULED
Qty: 14 TABLET | Refills: 0 | Status: SHIPPED | OUTPATIENT
Start: 2019-09-24 | End: 2019-10-01

## 2019-09-24 RX ORDER — METHYLPREDNISOLONE 4 MG/1
TABLET ORAL
Qty: 1 EACH | Refills: 0 | Status: SHIPPED | OUTPATIENT
Start: 2019-09-24 | End: 2019-12-19

## 2019-09-24 NOTE — PROGRESS NOTES
[facility]        NAME: Aba Rivera is a 30 y o  female  : 1990    MRN: [id number]  DATE: 2019  TIME: 11:21 AM    Assessment and Plan   Acute sinusitis, recurrence not specified, unspecified location [J01 90]  1  Acute sinusitis, recurrence not specified, unspecified location  methylPREDNISolone 4 MG tablet therapy pack    amoxicillin-clavulanate (AUGMENTIN) 875-125 mg per tablet         Patient Instructions     Recommend antibiotic therapy at this time and patient agrees  Also recommend supportive measures:   1  Push fluids and rest   2  OTC Tylenol/Motrin as needed for pain/fever   3  Flonase & Zyrtec daily      Follow up with PCP in 3-5 days  Proceed to  ER if symptoms worsen  Chief Complaint     Chief Complaint   Patient presents with    Cold Like Symptoms     C/O sinus congestion and pressure, post nalas drip, cough and tight in the chest x 4 days  History of Present Illness       31 y/o F presents for eval of sinus congestion onset 4 days ago with associated ear pressure, PND, cough, runny nose  Pt has been taking dayquil and claritin without improvement  No fever, chills, N/V, chest pain, dyspnea, dizziness  Review of Systems   Review of Systems   All other systems reviewed and are negative          Current Medications       Current Outpatient Medications:     ergocalciferol (VITAMIN D2) 50,000 units, Take 1 capsule (50,000 Units total) by mouth once a week, Disp: 12 capsule, Rfl: 1    MELODETTA 24 FE 1-20 MG-MCG(24) CHEW, Chew 1 tablet daily Chew, Disp: , Rfl: 12    amoxicillin-clavulanate (AUGMENTIN) 875-125 mg per tablet, Take 1 tablet by mouth every 12 (twelve) hours for 7 days, Disp: 14 tablet, Rfl: 0    methylPREDNISolone 4 MG tablet therapy pack, Use as directed on package, Disp: 1 each, Rfl: 0    Current Allergies     Allergies as of 2019 - Reviewed 2019   Allergen Reaction Noted    Pollen extract Allergic Rhinitis 2018 The following portions of the patient's history were reviewed and updated as appropriate: allergies, current medications, past family history, past medical history, past social history, past surgical history and problem list      Past Medical History:   Diagnosis Date    Allergic rhinitis     Obesity     Oral contraceptive use     Vitamin D deficiency disease        Past Surgical History:   Procedure Laterality Date    WISDOM TOOTH EXTRACTION         Family History   Problem Relation Age of Onset    Hypertension Mother     Diabetes Father          Medications have been verified  Objective   /76   Pulse 96   Temp 97 7 °F (36 5 °C) (Tympanic)   Resp 18   Ht 5' 7" (1 702 m)   Wt 127 kg (279 lb)   LMP 09/19/2019   SpO2 96%   BMI 43 70 kg/m²        Physical Exam     Physical Exam   Constitutional: She is oriented to person, place, and time  She appears well-developed and well-nourished  No distress  HENT:   Head: Normocephalic and atraumatic  Right Ear: Hearing, tympanic membrane, external ear and ear canal normal    Left Ear: Hearing, tympanic membrane, external ear and ear canal normal    Nose: Mucosal edema and rhinorrhea present  Mouth/Throat: Uvula is midline and mucous membranes are normal  Posterior oropharyngeal erythema present  Eyes: Pupils are equal, round, and reactive to light  Conjunctivae and EOM are normal    Neck: Normal range of motion  Neck supple  Cardiovascular: Normal rate and regular rhythm  Exam reveals no gallop and no friction rub  No murmur heard  Pulmonary/Chest: Effort normal and breath sounds normal  No respiratory distress  She has no wheezes  She has no rales  Neurological: She is alert and oriented to person, place, and time  Skin: Skin is warm and dry

## 2019-11-13 ENCOUNTER — OFFICE VISIT (OUTPATIENT)
Dept: INTERNAL MEDICINE CLINIC | Facility: CLINIC | Age: 29
End: 2019-11-13
Payer: COMMERCIAL

## 2019-11-13 VITALS
RESPIRATION RATE: 18 BRPM | DIASTOLIC BLOOD PRESSURE: 86 MMHG | OXYGEN SATURATION: 99 % | TEMPERATURE: 98.2 F | HEART RATE: 93 BPM | SYSTOLIC BLOOD PRESSURE: 122 MMHG | BODY MASS INDEX: 45.58 KG/M2 | HEIGHT: 67 IN | WEIGHT: 290.4 LBS

## 2019-11-13 DIAGNOSIS — R53.83 FATIGUE, UNSPECIFIED TYPE: ICD-10-CM

## 2019-11-13 DIAGNOSIS — Z13.220 SCREENING FOR HYPERLIPIDEMIA: ICD-10-CM

## 2019-11-13 DIAGNOSIS — Z12.4 SCREENING FOR CERVICAL CANCER: ICD-10-CM

## 2019-11-13 DIAGNOSIS — E55.9 VITAMIN D DEFICIENCY: Primary | ICD-10-CM

## 2019-11-13 DIAGNOSIS — Z13.1 SCREENING FOR DIABETES MELLITUS: ICD-10-CM

## 2019-11-13 DIAGNOSIS — Z13.29 SCREENING FOR THYROID DISORDER: ICD-10-CM

## 2019-11-13 DIAGNOSIS — E66.01 CLASS 3 SEVERE OBESITY DUE TO EXCESS CALORIES WITHOUT SERIOUS COMORBIDITY WITH BODY MASS INDEX (BMI) OF 45.0 TO 49.9 IN ADULT (HCC): ICD-10-CM

## 2019-11-13 PROCEDURE — 1036F TOBACCO NON-USER: CPT | Performed by: NURSE PRACTITIONER

## 2019-11-13 PROCEDURE — 99213 OFFICE O/P EST LOW 20 MIN: CPT | Performed by: NURSE PRACTITIONER

## 2019-11-13 RX ORDER — ERGOCALCIFEROL 1.25 MG/1
50000 CAPSULE ORAL WEEKLY
Qty: 12 CAPSULE | Refills: 3 | Status: SHIPPED | OUTPATIENT
Start: 2019-11-13 | End: 2021-05-25 | Stop reason: ALTCHOICE

## 2019-11-13 RX ORDER — ERGOCALCIFEROL 1.25 MG/1
50000 CAPSULE ORAL WEEKLY
Qty: 12 CAPSULE | Refills: 1 | Status: SHIPPED | OUTPATIENT
Start: 2019-11-13 | End: 2019-11-13 | Stop reason: SDUPTHER

## 2019-11-13 NOTE — PROGRESS NOTES
Assessment/Plan:    No problem-specific Assessment & Plan notes found for this encounter  Diagnoses and all orders for this visit:    Vitamin D deficiency  Comments:  Prescribed 50,000 IU vitamin D2 weekly  F/U bloodwork  Orders:  -     ergocalciferol (VITAMIN D2) 50,000 units; Take 1 capsule (50,000 Units total) by mouth once a week  -     Vitamin D 25 hydroxy; Future    Class 3 severe obesity due to excess calories without serious comorbidity with body mass index (BMI) of 45 0 to 49 9 in adult [facility])  Comments:  Advised on nutrition and exercise    BMI 45 0-49 9, adult ([facility])  Comments:  Advised on nutrition and exercise    Fatigue, unspecified type  -     CBC and differential; Future  -     UA w Reflex to Microscopic w Reflex to Culture    Screening for diabetes mellitus  -     Comprehensive metabolic panel; Future    Screening for hyperlipidemia  -     Lipid panel; Future    Screening for thyroid disorder  -     TSH, 3rd generation with Free T4 reflex; Future    Other orders  -     Ambulatory referral to Gynecology; Future          Subjective:      Patient ID: Abel Maxwell is a 30 y o  female  30year old requesting prescription for weekly 50,000 unit Vitamin D2  States she feels symptoms of seasonal depression and fatigue which she did not experience when she was taking the higher-dose Vitamin D2 weekly  Also states she is unable to sleep much due to 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 [employer]  Denies wanting any therapies related to anxiety/depression over this  Requesting referral for OB/GYN as her previous doctor for this is in [place], and she would prefer someone closer to her home  Received TDAP vaccine today as well and is currently on oral birth control   States she received her influenza vaccine at [facility]       The following portions of the patient's history were reviewed and updated as appropriate: She  has a past medical history of Allergic rhinitis, Obesity, Oral contraceptive use, and Vitamin D deficiency disease  She   Patient Active Problem List    Diagnosis Date Noted    BMI 45 0-49 9, adult ([facility] ) 11/13/2019    Screening for deficiency anemia 05/29/2019    Screening for diabetes mellitus 05/29/2019    Vitamin D deficiency 05/29/2019    Plantar wart of right foot 05/29/2019    Foot arch pain 05/29/2019    Pes planus of left foot 05/29/2019    Obesity 09/19/2018    Abnormal menses 09/19/2018    Allergic rhinitis 05/30/2018    Costochondritis 05/30/2018    Dyspnea on exertion 05/30/2018    Acute frontal sinusitis 03/30/2017     She  has a past surgical history that includes [place] tooth extraction  Her family history includes Diabetes in her father; Hypertension in her mother  She  reports that she has never smoked  She has never used smokeless tobacco  She reports that she drinks alcohol  She reports that she does not use drugs  Current Outpatient Medications   Medication Sig Dispense Refill    ergocalciferol (VITAMIN D2) 50,000 units Take 1 capsule (50,000 Units total) by mouth once a week 12 capsule 1    MELODETTA 24 FE 1-20 MG-MCG(24) CHEW Chew 1 tablet daily Chew  12    methylPREDNISolone 4 MG tablet therapy pack Use as directed on package (Patient not taking: Reported on 11/13/2019) 1 each 0     No current facility-administered medications for this visit  Current Outpatient Medications on File Prior to Visit   Medication Sig    ergocalciferol (VITAMIN D2) 50,000 units Take 1 capsule (50,000 Units total) by mouth once a week    MELODETTA 24 FE 1-20 MG-MCG(24) CHEW Chew 1 tablet daily Chew    methylPREDNISolone 4 MG tablet therapy pack Use as directed on package (Patient not taking: Reported on 11/13/2019)     No current facility-administered medications on file prior to visit  She is allergic to pollen extract       Review of Systems   Constitutional: Positive for fatigue     HENT: Negative for congestion, postnasal drip, rhinorrhea, sinus pain, sore throat and trouble swallowing  Eyes: Negative for pain and visual disturbance  Respiratory: Negative for cough, shortness of breath and wheezing  Cardiovascular: Negative for chest pain and palpitations  Gastrointestinal: Negative for constipation, diarrhea, nausea and vomiting  Endocrine: Negative for polydipsia, polyphagia and polyuria  Genitourinary: Negative for difficulty urinating, flank pain, frequency and pelvic pain  Musculoskeletal: Negative for arthralgias, back pain, joint swelling and myalgias  Skin: Negative  Negative for color change and rash  Neurological: Negative for dizziness, syncope, weakness, light-headedness, numbness and headaches  Hematological: Negative for adenopathy  Does not bruise/bleed easily  Psychiatric/Behavioral: Positive for sleep disturbance  Negative for behavioral problems  The patient is not nervous/anxious  BMI Counseling: Body mass index is 45 48 kg/m²  Discussed the patient's BMI with her  The BMI is above normal  Nutrition recommendations include reducing portion sizes, decreasing overall calorie intake, 3-5 servings of fruits/vegetables daily, reducing fast food intake, consuming healthier snacks, decreasing soda and/or juice intake, moderation in carbohydrate intake, increasing intake of lean protein, reducing intake of saturated fat and trans fat and reducing intake of cholesterol  Exercise recommendations include moderate aerobic physical activity for 150 minutes/week and exercising 3-5 times per week     PHQ-9 Depression Screening    PHQ-9:    Frequency of the following problems over the past two weeks:       Little interest or pleasure in doing things:  1 - several days  Feeling down, depressed, or hopeless:  2 - more than half the days  Trouble falling or staying asleep, or sleeping too much:  2 - more than half the days  Feeling tired or having little energy:  2 - more than half the days  Poor appetite or overeatin - several days  Feeling bad about yourself - or that you are a failure or have let yourself or your family down:  0 - not at all  Trouble concentrating on things, such as reading the newspaper or watching television:  0 - not at all  Moving or speaking so slowly that other people could have noticed  Or the opposite - being so fidgety or restless that you have been moving around a lot more than usual:  0 - not at all  Thoughts that you would be better off dead, or of hurting yourself in some way:  0 - not at all  PHQ-2 Score:  3  PHQ-9 Score:  8        Depression Screening Follow-up Plan: Patient's depression screening was positive with a PHQ-2 score of 3  Their PHQ-9 score was 8  Patient's depressive symptoms likely due to other medical condition  Would recommend treatment of underlying condition  Will continue to monitor at next office visit  Objective:      /86   Pulse 93   Temp 98 2 °F (36 8 °C) (Tympanic)   Resp 18   Ht 5' 7" (1 702 m)   Wt 132 kg (290 lb 6 4 oz)   SpO2 99%   BMI 45 48 kg/m²          Physical Exam   Constitutional: She is oriented to person, place, and time  She appears well-developed and well-nourished  HENT:   Head: Normocephalic  Eyes: Pupils are equal, round, and reactive to light  Neck: Normal range of motion  Cardiovascular: Normal rate and regular rhythm  Pulmonary/Chest: Effort normal and breath sounds normal    Abdominal: Soft  Bowel sounds are normal    Musculoskeletal: Normal range of motion  Neurological: She is alert and oriented to person, place, and time  Skin: Skin is warm and dry  Psychiatric: She has a normal mood and affect  Her behavior is normal  Judgment and thought content normal    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

## 2019-12-19 ENCOUNTER — OFFICE VISIT (OUTPATIENT)
Dept: URGENT CARE | Facility: CLINIC | Age: 29
End: 2019-12-19
Payer: COMMERCIAL

## 2019-12-19 VITALS
HEART RATE: 120 BPM | RESPIRATION RATE: 18 BRPM | OXYGEN SATURATION: 95 % | TEMPERATURE: 97.3 F | HEIGHT: 67 IN | SYSTOLIC BLOOD PRESSURE: 122 MMHG | BODY MASS INDEX: 45.52 KG/M2 | DIASTOLIC BLOOD PRESSURE: 84 MMHG | WEIGHT: 290 LBS

## 2019-12-19 DIAGNOSIS — J20.9 ACUTE BRONCHITIS, UNSPECIFIED ORGANISM: Primary | ICD-10-CM

## 2019-12-19 PROCEDURE — 99213 OFFICE O/P EST LOW 20 MIN: CPT | Performed by: EMERGENCY MEDICINE

## 2019-12-19 RX ORDER — AMOXICILLIN 500 MG/1
500 CAPSULE ORAL EVERY 8 HOURS SCHEDULED
Qty: 21 CAPSULE | Refills: 0 | Status: SHIPPED | OUTPATIENT
Start: 2019-12-19 | End: 2019-12-26

## 2019-12-19 NOTE — PATIENT INSTRUCTIONS
1  Mucinex 600 mgs every 12 hours for cough  2  Humidify air in bedroom  3  Claritin-DM for nasal congestion, Post nasal drip  4  See your physician if no improvement    Acute Bronchitis   WHAT YOU NEED TO KNOW:   Acute bronchitis is swelling and irritation in the air passages of your lungs  This irritation may cause you to cough or have other breathing problems  Acute bronchitis often starts because of another illness, such as a cold or the flu  The illness spreads from your nose and throat to your windpipe and airways  Bronchitis is often called a chest cold  Acute bronchitis lasts about 3 to 6 weeks and is usually not a serious illness  Your cough can last for several weeks  DISCHARGE INSTRUCTIONS:   Return to the emergency department if:   · You cough up blood  · Your lips or fingernails turn blue  · You feel like you are not getting enough air when you breathe  Contact your healthcare provider if:   · You have a fever  · Your breathing problems do not go away or get worse  · Your cough does not get better within 4 weeks  · You have questions or concerns about your condition or care  Self-care:   · Get more rest   Rest helps your body to heal  Slowly start to do more each day  Rest when you feel it is needed  · Avoid irritants in the air  Avoid chemicals, fumes, and dust  Wear a face mask if you must work around dust or fumes  Stay inside on days when air pollution levels are high  If you have allergies, stay inside when pollen counts are high  Do not use aerosol products, such as spray-on deodorant, bug spray, and hair spray  · Do not smoke or be around others who smoke  Nicotine and other chemicals in cigarettes and cigars damages the cilia that move mucus out of your lungs  Ask your healthcare provider for information if you currently smoke and need help to quit  E-cigarettes or smokeless tobacco still contain nicotine   Talk to your healthcare provider before you use these products  · Drink liquids as directed  Liquids help keep your air passages moist and help you cough up mucus  You may need to drink more liquids when you have acute bronchitis  Ask how much liquid to drink each day and which liquids are best for you  · Use a humidifier or vaporizer  Use a cool mist humidifier or a vaporizer to increase air moisture in your home  This may make it easier for you to breathe and help decrease your cough  Decrease risk for acute bronchitis:   · Get the vaccinations you need  Ask your healthcare provider if you should get vaccinated against the flu or pneumonia  · Prevent the spread of germs  You can decrease your risk of acute bronchitis and other illnesses by doing the following:     [facility] your hands often with soap and water  Carry germ-killing hand lotion or gel with you  You can use the lotion or gel to clean your hands when soap and water are not available  ¨ Do not touch your eyes, nose, or mouth unless you have washed your hands first     ¨ Always cover your mouth when you cough to prevent the spread of germs  It is best to cough into a tissue or your shirt sleeve instead of into your hand  Ask those around you cover their mouths when they cough  ¨ Try to avoid people who have a cold or the flu  If you are sick, stay away from others as much as possible  Medicines: Your healthcare provider may  give you any of the following:  · Ibuprofen or acetaminophen  are medicines that help lower your fever  They are available without a doctor's order  Ask your healthcare provider which medicine is right for you  Ask how much to take and how often to take it  Follow directions  These medicines can cause stomach bleeding if not taken correctly  Ibuprofen can cause kidney damage  Do not take ibuprofen if you have kidney disease, an ulcer, or allergies to aspirin  Acetaminophen can cause liver damage  Do not take more than 4,000 milligrams in 24 hours       · Decongestants help loosen mucus in your lungs and make it easier to cough up  This can help you breathe easier  · Cough suppressants  decrease your urge to cough  If your cough produces mucus, do not take a cough suppressant unless your healthcare provider tells you to  Your healthcare provider may suggest that you take a cough suppressant at night so you can rest     · Inhalers  may be given  Your healthcare provider may give you one or more inhalers to help you breathe easier and cough less  An inhaler gives your medicine to open your airways  Ask your healthcare provider to show you how to use your inhaler correctly  · Take your medicine as directed  Contact your healthcare provider if you think your medicine is not helping or if you have side effects  Tell him of her if you are allergic to any medicine  Keep a list of the medicines, vitamins, and herbs you take  Include the amounts, and when and why you take them  Bring the list or the pill bottles to follow-up visits  Carry your medicine list with you in case of an emergency  Follow up with your healthcare provider as directed:  Write down questions you have so you will remember to ask them during your follow-up visits  © 2017 [employer]  Information is for End User's use only and may not be sold, redistributed or otherwise used for commercial purposes  All illustrations and images included in CareNotes® are the copyrighted property of [employer] , [employer]  or [employer]  The above information is an  only  It is not intended as medical advice for individual conditions or treatments  Talk to your doctor, nurse or pharmacist before following any medical regimen to see if it is safe and effective for you

## 2019-12-19 NOTE — PROGRESS NOTES
[facility]        NAME: Keya Gallego is a 30 y o  female  : 1990    MRN: [id number]  DATE: 2019  TIME: 10:08 AM    Assessment and Plan   Acute bronchitis, unspecified organism [J20 9]  1  Acute bronchitis, unspecified organism  amoxicillin (AMOXIL) 500 mg capsule         Patient Instructions     Patient Instructions       1  Mucinex 600 mgs every 12 hours for cough  2  Humidify air in bedroom  3  Claritin-DM for nasal congestion, Post nasal drip  4  See your physician if no improvement    Acute Bronchitis   WHAT YOU NEED TO KNOW:   Acute bronchitis is swelling and irritation in the air passages of your lungs  This irritation may cause you to cough or have other breathing problems  Acute bronchitis often starts because of another illness, such as a cold or the flu  The illness spreads from your nose and throat to your windpipe and airways  Bronchitis is often called a chest cold  Acute bronchitis lasts about 3 to 6 weeks and is usually not a serious illness  Your cough can last for several weeks  DISCHARGE INSTRUCTIONS:   Return to the emergency department if:   · You cough up blood  · Your lips or fingernails turn blue  · You feel like you are not getting enough air when you breathe  Contact your healthcare provider if:   · You have a fever  · Your breathing problems do not go away or get worse  · Your cough does not get better within 4 weeks  · You have questions or concerns about your condition or care  Self-care:   · Get more rest   Rest helps your body to heal  Slowly start to do more each day  Rest when you feel it is needed  · Avoid irritants in the air  Avoid chemicals, fumes, and dust  Wear a face mask if you must work around dust or fumes  Stay inside on days when air pollution levels are high  If you have allergies, stay inside when pollen counts are high   Do not use aerosol products, such as spray-on deodorant, bug spray, and hair spray     · Do not smoke or be around others who smoke  Nicotine and other chemicals in cigarettes and cigars damages the cilia that move mucus out of your lungs  Ask your healthcare provider for information if you currently smoke and need help to quit  E-cigarettes or smokeless tobacco still contain nicotine  Talk to your healthcare provider before you use these products  · Drink liquids as directed  Liquids help keep your air passages moist and help you cough up mucus  You may need to drink more liquids when you have acute bronchitis  Ask how much liquid to drink each day and which liquids are best for you  · Use a humidifier or vaporizer  Use a cool mist humidifier or a vaporizer to increase air moisture in your home  This may make it easier for you to breathe and help decrease your cough  Decrease risk for acute bronchitis:   · Get the vaccinations you need  Ask your healthcare provider if you should get vaccinated against the flu or pneumonia  · Prevent the spread of germs  You can decrease your risk of acute bronchitis and other illnesses by doing the following:     [facility] your hands often with soap and water  Carry germ-killing hand lotion or gel with you  You can use the lotion or gel to clean your hands when soap and water are not available  ¨ Do not touch your eyes, nose, or mouth unless you have washed your hands first     ¨ Always cover your mouth when you cough to prevent the spread of germs  It is best to cough into a tissue or your shirt sleeve instead of into your hand  Ask those around you cover their mouths when they cough  ¨ Try to avoid people who have a cold or the flu  If you are sick, stay away from others as much as possible  Medicines: Your healthcare provider may  give you any of the following:  · Ibuprofen or acetaminophen  are medicines that help lower your fever  They are available without a doctor's order   Ask your healthcare provider which medicine is right for you  Ask how much to take and how often to take it  Follow directions  These medicines can cause stomach bleeding if not taken correctly  Ibuprofen can cause kidney damage  Do not take ibuprofen if you have kidney disease, an ulcer, or allergies to aspirin  Acetaminophen can cause liver damage  Do not take more than 4,000 milligrams in 24 hours  · Decongestants  help loosen mucus in your lungs and make it easier to cough up  This can help you breathe easier  · Cough suppressants  decrease your urge to cough  If your cough produces mucus, do not take a cough suppressant unless your healthcare provider tells you to  Your healthcare provider may suggest that you take a cough suppressant at night so you can rest     · Inhalers  may be given  Your healthcare provider may give you one or more inhalers to help you breathe easier and cough less  An inhaler gives your medicine to open your airways  Ask your healthcare provider to show you how to use your inhaler correctly  · Take your medicine as directed  Contact your healthcare provider if you think your medicine is not helping or if you have side effects  Tell him of her if you are allergic to any medicine  Keep a list of the medicines, vitamins, and herbs you take  Include the amounts, and when and why you take them  Bring the list or the pill bottles to follow-up visits  Carry your medicine list with you in case of an emergency  Follow up with your healthcare provider as directed:  Write down questions you have so you will remember to ask them during your follow-up visits  © 2017 [employer] [employer] Information is for End User's use only and may not be sold, redistributed or otherwise used for commercial purposes  All illustrations and images included in CareNotes® are the copyrighted property of A D A M , Inc  or [facility]  The above information is an  only   It is not intended as medical advice for individual conditions or treatments  Talk to your doctor, nurse or pharmacist before following any medical regimen to see if it is safe and effective for you  Follow up with PCP in 3-5 days  Proceed to  ER if symptoms worsen  Chief Complaint     Chief Complaint   Patient presents with    Cold Like Symptoms     C/O head congestion, post nasal drip, sore throat and cough x 2 weeks   The cough has worsened over the last week  History of Present Illness       This is a 28-year-old female who presents with 2 weeks of a cough  The cough is dry with scant mucus period began with a sore throat and fever up to a T-max a 101° approximately 2 weeks ago  Last fever noted was 19 days ago  Patient progressed to mild laryngitis and now has a dry hacky cough  Patient does not have a history of asthma  She is a nonsmoker  And there are other coworkers with similar symptoms  Review of Systems   Review of Systems   Constitutional: Positive for fever  HENT: Positive for congestion, postnasal drip and sore throat  Negative for sinus pressure and sinus pain  Eyes: Negative for discharge and redness  Respiratory: Positive for cough  Negative for shortness of breath and wheezing  Gastrointestinal: Negative for diarrhea, nausea and vomiting  Genitourinary: Negative for frequency  Patient is 7 days late for her menses           Current Medications       Current Outpatient Medications:     amoxicillin (AMOXIL) 500 mg capsule, Take 1 capsule (500 mg total) by mouth every 8 (eight) hours for 7 days, Disp: 21 capsule, Rfl: 0    ergocalciferol (VITAMIN D2) 50,000 units, Take 1 capsule (50,000 Units total) by mouth once a week, Disp: 12 capsule, Rfl: 3    MELODETTA 24 FE 1-20 MG-MCG(24) CHEW, Chew 1 tablet daily Chew, Disp: , Rfl: 12    Current Allergies     Allergies as of 12/19/2019 - Reviewed 12/19/2019   Allergen Reaction Noted    Pollen extract Allergic Rhinitis 09/19/2018            The following portions of the patient's history were reviewed and updated as appropriate: allergies, current medications, past family history, past medical history, past social history, past surgical history and problem list      Past Medical History:   Diagnosis Date    Allergic rhinitis     Obesity     Oral contraceptive use     Vitamin D deficiency disease        Past Surgical History:   Procedure Laterality Date    WISDOM TOOTH EXTRACTION         Family History   Problem Relation Age of Onset    Hypertension Mother     Diabetes Father          Medications have been verified  Objective   /84   Pulse (!) 120   Temp (!) 97 3 °F (36 3 °C) (Tympanic)   Resp 18   Ht 5' 7" (1 702 m)   Wt 132 kg (290 lb)   LMP 11/13/2019   SpO2 95%   BMI 45 42 kg/m²        Physical Exam     Physical Exam   Constitutional: She is oriented to person, place, and time  She appears well-developed and well-nourished  HENT:   Head: Normocephalic and atraumatic  Right Ear: External ear normal    Left Ear: External ear normal    Nose: Nose normal    Mouth/Throat: Oropharynx is clear and moist    TMs are clear bilaterally  Nose  shows mild erythema of the turbinates with a whitish discharge  Eyes: Pupils are equal, round, and reactive to light  Conjunctivae and EOM are normal    Neck: Normal range of motion  Neck supple  Cardiovascular: Normal rate, regular rhythm and normal heart sounds  Pulmonary/Chest: Effort normal and breath sounds normal    Abdominal: Soft  Bowel sounds are normal  She exhibits no mass  There is no guarding  Musculoskeletal: Normal range of motion  She exhibits no edema  Neurological: She is alert and oriented to person, place, and time  Skin: Skin is warm and dry  No rash noted  Psychiatric: She has a normal mood and affect  Her behavior is normal    Nursing note and vitals reviewed

## 2020-05-26 ENCOUNTER — APPOINTMENT (OUTPATIENT)
Dept: LAB | Facility: CLINIC | Age: 30
End: 2020-05-26
Payer: COMMERCIAL

## 2020-05-26 ENCOUNTER — ANNUAL EXAM (OUTPATIENT)
Dept: OBGYN CLINIC | Facility: CLINIC | Age: 30
End: 2020-05-26
Payer: COMMERCIAL

## 2020-05-26 VITALS — SYSTOLIC BLOOD PRESSURE: 118 MMHG | DIASTOLIC BLOOD PRESSURE: 80 MMHG | BODY MASS INDEX: 46.2 KG/M2 | WEIGHT: 293 LBS

## 2020-05-26 DIAGNOSIS — Z01.419 WOMEN'S ANNUAL ROUTINE GYNECOLOGICAL EXAMINATION: Primary | ICD-10-CM

## 2020-05-26 DIAGNOSIS — Z11.3 SCREENING EXAMINATION FOR VENEREAL DISEASE: ICD-10-CM

## 2020-05-26 DIAGNOSIS — R53.83 FATIGUE, UNSPECIFIED TYPE: ICD-10-CM

## 2020-05-26 DIAGNOSIS — Z13.220 SCREENING FOR HYPERLIPIDEMIA: ICD-10-CM

## 2020-05-26 DIAGNOSIS — Z13.29 SCREENING FOR THYROID DISORDER: ICD-10-CM

## 2020-05-26 DIAGNOSIS — E55.9 VITAMIN D DEFICIENCY: ICD-10-CM

## 2020-05-26 DIAGNOSIS — Z30.41 ENCOUNTER FOR SURVEILLANCE OF CONTRACEPTIVE PILLS: ICD-10-CM

## 2020-05-26 DIAGNOSIS — Z13.1 SCREENING FOR DIABETES MELLITUS: ICD-10-CM

## 2020-05-26 LAB
25(OH)D3 SERPL-MCNC: 42.7 NG/ML (ref 30–100)
ALBUMIN SERPL BCP-MCNC: 3.5 G/DL (ref 3.5–5)
ALP SERPL-CCNC: 46 U/L (ref 46–116)
ALT SERPL W P-5'-P-CCNC: 33 U/L (ref 12–78)
ANION GAP SERPL CALCULATED.3IONS-SCNC: 5 MMOL/L (ref 4–13)
AST SERPL W P-5'-P-CCNC: 20 U/L (ref 5–45)
BACTERIA UR QL AUTO: ABNORMAL /HPF
BASOPHILS # BLD AUTO: 0.06 THOUSANDS/ΜL (ref 0–0.1)
BASOPHILS NFR BLD AUTO: 1 % (ref 0–1)
BILIRUB SERPL-MCNC: 0.43 MG/DL (ref 0.2–1)
BILIRUB UR QL STRIP: NEGATIVE
BUN SERPL-MCNC: 8 MG/DL (ref 5–25)
CALCIUM SERPL-MCNC: 9 MG/DL (ref 8.3–10.1)
CHLORIDE SERPL-SCNC: 105 MMOL/L (ref 100–108)
CHOLEST SERPL-MCNC: 222 MG/DL (ref 50–200)
CLARITY UR: ABNORMAL
CO2 SERPL-SCNC: 27 MMOL/L (ref 21–32)
COLOR UR: YELLOW
CREAT SERPL-MCNC: 0.8 MG/DL (ref 0.6–1.3)
EOSINOPHIL # BLD AUTO: 0.13 THOUSAND/ΜL (ref 0–0.61)
EOSINOPHIL NFR BLD AUTO: 2 % (ref 0–6)
ERYTHROCYTE [DISTWIDTH] IN BLOOD BY AUTOMATED COUNT: 13.6 % (ref 11.6–15.1)
FINE GRAN CASTS URNS QL MICRO: ABNORMAL /LPF
GFR SERPL CREATININE-BSD FRML MDRD: 100 ML/MIN/1.73SQ M
GLUCOSE P FAST SERPL-MCNC: 92 MG/DL (ref 65–99)
GLUCOSE UR STRIP-MCNC: NEGATIVE MG/DL
HCT VFR BLD AUTO: 46.7 % (ref 34.8–46.1)
HDLC SERPL-MCNC: 43 MG/DL
HGB BLD-MCNC: 14.5 G/DL (ref 11.5–15.4)
HGB UR QL STRIP.AUTO: NEGATIVE
IMM GRANULOCYTES # BLD AUTO: 0.02 THOUSAND/UL (ref 0–0.2)
IMM GRANULOCYTES NFR BLD AUTO: 0 % (ref 0–2)
KETONES UR STRIP-MCNC: NEGATIVE MG/DL
LDLC SERPL CALC-MCNC: 114 MG/DL (ref 0–100)
LEUKOCYTE ESTERASE UR QL STRIP: ABNORMAL
LYMPHOCYTES # BLD AUTO: 2.21 THOUSANDS/ΜL (ref 0.6–4.47)
LYMPHOCYTES NFR BLD AUTO: 34 % (ref 14–44)
MCH RBC QN AUTO: 24.9 PG (ref 26.8–34.3)
MCHC RBC AUTO-ENTMCNC: 31 G/DL (ref 31.4–37.4)
MCV RBC AUTO: 80 FL (ref 82–98)
MONOCYTES # BLD AUTO: 0.51 THOUSAND/ΜL (ref 0.17–1.22)
MONOCYTES NFR BLD AUTO: 8 % (ref 4–12)
NEUTROPHILS # BLD AUTO: 3.59 THOUSANDS/ΜL (ref 1.85–7.62)
NEUTS SEG NFR BLD AUTO: 55 % (ref 43–75)
NITRITE UR QL STRIP: NEGATIVE
NON-SQ EPI CELLS URNS QL MICRO: ABNORMAL /HPF
NONHDLC SERPL-MCNC: 179 MG/DL
NRBC BLD AUTO-RTO: 0 /100 WBCS
PH UR STRIP.AUTO: 6 [PH]
PLATELET # BLD AUTO: 260 THOUSANDS/UL (ref 149–390)
PMV BLD AUTO: 10 FL (ref 8.9–12.7)
POTASSIUM SERPL-SCNC: 4 MMOL/L (ref 3.5–5.3)
PROT SERPL-MCNC: 7.2 G/DL (ref 6.4–8.2)
PROT UR STRIP-MCNC: NEGATIVE MG/DL
RBC # BLD AUTO: 5.82 MILLION/UL (ref 3.81–5.12)
RBC #/AREA URNS AUTO: ABNORMAL /HPF
SODIUM SERPL-SCNC: 137 MMOL/L (ref 136–145)
SP GR UR STRIP.AUTO: 1.01 (ref 1–1.03)
TRIGL SERPL-MCNC: 325 MG/DL
TSH SERPL DL<=0.05 MIU/L-ACNC: 2.56 UIU/ML (ref 0.36–3.74)
UROBILINOGEN UR QL STRIP.AUTO: 0.2 E.U./DL
WBC # BLD AUTO: 6.52 THOUSAND/UL (ref 4.31–10.16)
WBC #/AREA URNS AUTO: ABNORMAL /HPF

## 2020-05-26 PROCEDURE — G0145 SCR C/V CYTO,THINLAYER,RESCR: HCPCS | Performed by: OBSTETRICS & GYNECOLOGY

## 2020-05-26 PROCEDURE — 85025 COMPLETE CBC W/AUTO DIFF WBC: CPT

## 2020-05-26 PROCEDURE — 36415 COLL VENOUS BLD VENIPUNCTURE: CPT

## 2020-05-26 PROCEDURE — 82306 VITAMIN D 25 HYDROXY: CPT

## 2020-05-26 PROCEDURE — 87086 URINE CULTURE/COLONY COUNT: CPT | Performed by: NURSE PRACTITIONER

## 2020-05-26 PROCEDURE — 80053 COMPREHEN METABOLIC PANEL: CPT

## 2020-05-26 PROCEDURE — 80061 LIPID PANEL: CPT

## 2020-05-26 PROCEDURE — 81001 URINALYSIS AUTO W/SCOPE: CPT | Performed by: NURSE PRACTITIONER

## 2020-05-26 PROCEDURE — 84443 ASSAY THYROID STIM HORMONE: CPT

## 2020-05-26 PROCEDURE — 99385 PREV VISIT NEW AGE 18-39: CPT | Performed by: OBSTETRICS & GYNECOLOGY

## 2020-05-26 RX ORDER — NORETHINDRONE ACETATE AND ETHINYL ESTRADIOL AND FERROUS FUMARATE 1MG-20(24)
1 KIT ORAL DAILY
Qty: 28 TABLET | Refills: 12 | Status: SHIPPED | OUTPATIENT
Start: 2020-05-26 | End: 2021-06-03

## 2020-05-26 RX ORDER — LORATADINE 10 MG/1
10 TABLET ORAL DAILY
COMMUNITY

## 2020-05-28 LAB — BACTERIA UR CULT: NORMAL

## 2020-06-01 ENCOUNTER — APPOINTMENT (OUTPATIENT)
Dept: LAB | Facility: CLINIC | Age: 30
End: 2020-06-01
Payer: COMMERCIAL

## 2020-06-01 ENCOUNTER — OFFICE VISIT (OUTPATIENT)
Dept: INTERNAL MEDICINE CLINIC | Facility: CLINIC | Age: 30
End: 2020-06-01
Payer: COMMERCIAL

## 2020-06-01 VITALS
DIASTOLIC BLOOD PRESSURE: 82 MMHG | HEART RATE: 98 BPM | BODY MASS INDEX: 44.41 KG/M2 | OXYGEN SATURATION: 99 % | WEIGHT: 293 LBS | HEIGHT: 68 IN | SYSTOLIC BLOOD PRESSURE: 110 MMHG | TEMPERATURE: 99.6 F | RESPIRATION RATE: 18 BRPM

## 2020-06-01 DIAGNOSIS — E61.1 LOW IRON: ICD-10-CM

## 2020-06-01 DIAGNOSIS — N92.6 ABNORMAL MENSES: ICD-10-CM

## 2020-06-01 DIAGNOSIS — Z11.4 SCREENING FOR HIV (HUMAN IMMUNODEFICIENCY VIRUS): ICD-10-CM

## 2020-06-01 DIAGNOSIS — E53.8 LOW VITAMIN B12 LEVEL: ICD-10-CM

## 2020-06-01 DIAGNOSIS — Z00.00 ANNUAL PHYSICAL EXAM: Primary | ICD-10-CM

## 2020-06-01 DIAGNOSIS — F32.A DEPRESSION, UNSPECIFIED DEPRESSION TYPE: ICD-10-CM

## 2020-06-01 DIAGNOSIS — E55.9 VITAMIN D DEFICIENCY: ICD-10-CM

## 2020-06-01 LAB
FERRITIN SERPL-MCNC: 35 NG/ML (ref 8–388)
IRON SATN MFR SERPL: 30 %
IRON SERPL-MCNC: 107 UG/DL (ref 50–170)
TIBC SERPL-MCNC: 353 UG/DL (ref 250–450)
VIT B12 SERPL-MCNC: 308 PG/ML (ref 100–900)

## 2020-06-01 PROCEDURE — 87389 HIV-1 AG W/HIV-1&-2 AB AG IA: CPT

## 2020-06-01 PROCEDURE — 83550 IRON BINDING TEST: CPT

## 2020-06-01 PROCEDURE — 36415 COLL VENOUS BLD VENIPUNCTURE: CPT

## 2020-06-01 PROCEDURE — 82607 VITAMIN B-12: CPT

## 2020-06-01 PROCEDURE — 82728 ASSAY OF FERRITIN: CPT

## 2020-06-01 PROCEDURE — 83540 ASSAY OF IRON: CPT

## 2020-06-01 PROCEDURE — 99395 PREV VISIT EST AGE 18-39: CPT | Performed by: NURSE PRACTITIONER

## 2020-06-01 RX ORDER — BUPROPION HYDROCHLORIDE 150 MG/1
150 TABLET ORAL DAILY
Qty: 90 TABLET | Refills: 3 | Status: SHIPPED | OUTPATIENT
Start: 2020-06-01 | End: 2020-07-06 | Stop reason: SDUPTHER

## 2020-06-02 LAB
C TRACH DNA SPEC QL NAA+PROBE: ABNORMAL
HIV 1+2 AB+HIV1 P24 AG SERPL QL IA: NORMAL
N GONORRHOEA DNA SPEC QL NAA+PROBE: ABNORMAL

## 2020-06-02 PROCEDURE — 87491 CHLMYD TRACH DNA AMP PROBE: CPT | Performed by: OBSTETRICS & GYNECOLOGY

## 2020-06-02 PROCEDURE — 87591 N.GONORRHOEAE DNA AMP PROB: CPT | Performed by: OBSTETRICS & GYNECOLOGY

## 2020-06-03 LAB
LAB AP GYN PRIMARY INTERPRETATION: NORMAL
LAB AP LMP: NORMAL
Lab: NORMAL

## 2020-07-06 ENCOUNTER — OFFICE VISIT (OUTPATIENT)
Dept: INTERNAL MEDICINE CLINIC | Facility: CLINIC | Age: 30
End: 2020-07-06
Payer: COMMERCIAL

## 2020-07-06 VITALS
DIASTOLIC BLOOD PRESSURE: 72 MMHG | OXYGEN SATURATION: 97 % | HEIGHT: 68 IN | SYSTOLIC BLOOD PRESSURE: 112 MMHG | HEART RATE: 96 BPM | WEIGHT: 292 LBS | TEMPERATURE: 98.3 F | BODY MASS INDEX: 44.25 KG/M2 | RESPIRATION RATE: 16 BRPM

## 2020-07-06 DIAGNOSIS — F32.A DEPRESSION, UNSPECIFIED DEPRESSION TYPE: Primary | ICD-10-CM

## 2020-07-06 DIAGNOSIS — Z23 ENCOUNTER FOR VACCINATION: ICD-10-CM

## 2020-07-06 DIAGNOSIS — N92.6 ABNORMAL MENSES: ICD-10-CM

## 2020-07-06 PROBLEM — F32.9 REACTIVE DEPRESSION: Status: ACTIVE | Noted: 2020-07-06

## 2020-07-06 PROCEDURE — 90471 IMMUNIZATION ADMIN: CPT | Performed by: NURSE PRACTITIONER

## 2020-07-06 PROCEDURE — 99213 OFFICE O/P EST LOW 20 MIN: CPT | Performed by: NURSE PRACTITIONER

## 2020-07-06 PROCEDURE — 1036F TOBACCO NON-USER: CPT | Performed by: NURSE PRACTITIONER

## 2020-07-06 PROCEDURE — 90715 TDAP VACCINE 7 YRS/> IM: CPT | Performed by: NURSE PRACTITIONER

## 2020-07-06 PROCEDURE — 3008F BODY MASS INDEX DOCD: CPT | Performed by: NURSE PRACTITIONER

## 2020-07-06 RX ORDER — BUPROPION HYDROCHLORIDE 150 MG/1
150 TABLET ORAL DAILY
Qty: 90 TABLET | Refills: 3 | Status: SHIPPED | OUTPATIENT
Start: 2020-07-06 | End: 2021-05-25 | Stop reason: SDUPTHER

## 2020-07-06 NOTE — PROGRESS NOTES
Assessment/Plan:    No problem-specific Assessment & Plan notes found for this encounter  Diagnoses and all orders for this visit:    Depression, unspecified depression type  Comments:  Pt reports much improvement with Wellbutrin -will continue  Orders:  -     buPROPion (WELLBUTRIN XL) 150 mg 24 hr tablet; Take 1 tablet (150 mg total) by mouth daily    Abnormal menses  Comments:  pt is using OCP without complaint today    Encounter for vaccination  Comments: Tdap admin  Orders:  -     TDAP VACCINE GREATER THAN OR EQUAL TO 6YO IM          Subjective:      Patient ID: Mark Anthony Mederos is a 35 y o  female  F/u depression, abnormal menses    35 yr old female presents today post initiation of Wellbutrin, states she feels great, no more depression, is starting a job this week and got rid of her boyfriend who has been cheating on her as she reports  Pt offers no complaints otherwise today      The following portions of the patient's history were reviewed and updated as appropriate: She  has a past medical history of Allergic rhinitis, Depression, Obesity, Oral contraceptive use, and Vitamin D deficiency disease  She   Patient Active Problem List    Diagnosis Date Noted    Reactive depression 07/06/2020    BMI 45 0-49 9, adult ([facility] [address] ) 11/13/2019    Screening for deficiency anemia 05/29/2019    Screening for diabetes mellitus 05/29/2019    Vitamin D deficiency 05/29/2019    Plantar wart of right foot 05/29/2019    Foot arch pain 05/29/2019    Pes planus of left foot 05/29/2019    Obesity 09/19/2018    Abnormal menses 09/19/2018    Allergic rhinitis 05/30/2018    Costochondritis 05/30/2018    Dyspnea on exertion 05/30/2018    Acute frontal sinusitis 03/30/2017     She  has a past surgical history that includes [place] tooth extraction  Her family history includes Diabetes in her father; Hypertension in her mother  She  reports that she has never smoked   She has never used smokeless tobacco  She reports that she drinks alcohol  She reports that she does not use drugs  Current Outpatient Medications   Medication Sig Dispense Refill    buPROPion (WELLBUTRIN XL) 150 mg 24 hr tablet Take 1 tablet (150 mg total) by mouth daily 90 tablet 3    ergocalciferol (VITAMIN D2) 50,000 units Take 1 capsule (50,000 Units total) by mouth once a week 12 capsule 3    loratadine (CLARITIN) 10 mg tablet Take 10 mg by mouth daily      MELODETTA 24 FE 1-20 MG-MCG(24) CHEW Chew 1 tablet daily Chew 28 tablet 12     No current facility-administered medications for this visit  Current Outpatient Medications on File Prior to Visit   Medication Sig    ergocalciferol (VITAMIN D2) 50,000 units Take 1 capsule (50,000 Units total) by mouth once a week    loratadine (CLARITIN) 10 mg tablet Take 10 mg by mouth daily    MELODETTA 24 FE 1-20 MG-MCG(24) CHEW Chew 1 tablet daily Chew    [DISCONTINUED] buPROPion (WELLBUTRIN XL) 150 mg 24 hr tablet Take 1 tablet (150 mg total) by mouth daily     No current facility-administered medications on file prior to visit  She is allergic to pollen extract       Review of Systems   Constitutional: Negative  HENT: Negative  Eyes: Negative  Respiratory: Negative  Cardiovascular: Negative  Gastrointestinal: Negative  Endocrine: Negative  Genitourinary: Negative  Musculoskeletal: Negative  Skin: Negative  Allergic/Immunologic: Negative  Neurological: Negative  Hematological: Negative  Psychiatric/Behavioral: Negative  Objective:      /72   Pulse 96   Temp 98 3 °F (36 8 °C) (Tympanic)   Resp 16   Ht 5' 8" (1 727 m)   Wt 132 kg (292 lb)   SpO2 97%   BMI 44 40 kg/m²          Physical Exam   Constitutional: She is oriented to person, place, and time  She appears well-developed and well-nourished  HENT:   Head: Normocephalic  Eyes: Pupils are equal, round, and reactive to light  Neck: Normal range of motion     Cardiovascular: Normal rate and regular rhythm  Pulmonary/Chest: Effort normal and breath sounds normal    Abdominal: Soft  Bowel sounds are normal    Musculoskeletal: Normal range of motion  Neurological: She is alert and oriented to person, place, and time  Skin: Skin is warm and dry  Psychiatric: She has a normal mood and affect  Her behavior is normal  Judgment and thought content normal    Nursing note and vitals reviewed

## 2020-10-13 ENCOUNTER — OFFICE VISIT (OUTPATIENT)
Dept: URGENT CARE | Facility: CLINIC | Age: 30
End: 2020-10-13
Payer: COMMERCIAL

## 2020-10-13 VITALS
TEMPERATURE: 98.1 F | RESPIRATION RATE: 18 BRPM | WEIGHT: 286 LBS | DIASTOLIC BLOOD PRESSURE: 69 MMHG | BODY MASS INDEX: 43.35 KG/M2 | HEIGHT: 68 IN | HEART RATE: 102 BPM | OXYGEN SATURATION: 99 % | SYSTOLIC BLOOD PRESSURE: 124 MMHG

## 2020-10-13 DIAGNOSIS — J01.00 ACUTE MAXILLARY SINUSITIS, RECURRENCE NOT SPECIFIED: Primary | ICD-10-CM

## 2020-10-13 PROCEDURE — 99203 OFFICE O/P NEW LOW 30 MIN: CPT | Performed by: PHYSICIAN ASSISTANT

## 2020-10-13 RX ORDER — METHYLPREDNISOLONE 4 MG/1
TABLET ORAL
Qty: 21 TABLET | Refills: 0 | Status: SHIPPED | OUTPATIENT
Start: 2020-10-13 | End: 2021-01-21 | Stop reason: ALTCHOICE

## 2020-10-13 RX ORDER — AMOXICILLIN AND CLAVULANATE POTASSIUM 875; 125 MG/1; MG/1
1 TABLET, FILM COATED ORAL EVERY 12 HOURS SCHEDULED
Qty: 14 TABLET | Refills: 0 | Status: SHIPPED | OUTPATIENT
Start: 2020-10-13 | End: 2020-10-20

## 2021-01-21 ENCOUNTER — IN HOME VISIT (OUTPATIENT)
Dept: FAMILY MEDICINE CLINIC | Facility: CLINIC | Age: 31
End: 2021-01-21
Payer: COMMERCIAL

## 2021-01-21 VITALS
SYSTOLIC BLOOD PRESSURE: 134 MMHG | TEMPERATURE: 98.4 F | BODY MASS INDEX: 39.25 KG/M2 | OXYGEN SATURATION: 100 % | WEIGHT: 265 LBS | HEART RATE: 104 BPM | DIASTOLIC BLOOD PRESSURE: 76 MMHG | HEIGHT: 69 IN

## 2021-01-21 DIAGNOSIS — Z00.00 ANNUAL PHYSICAL EXAM: Primary | ICD-10-CM

## 2021-01-21 PROCEDURE — 99385 PREV VISIT NEW AGE 18-39: CPT | Performed by: FAMILY MEDICINE

## 2021-01-21 NOTE — PROGRESS NOTES
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 at [facility]  The patient was advised to follow-up with their PCP, or establish with a PCP to address any acute complaints that we discussed today and to follow-up any chronic problems  The purpose of our visit today was to complete an annual physical only  Assessment and Plan:     Problem List Items Addressed This Visit     None      Visit Diagnoses     Annual physical exam    -  Primary          Immunizations and preventive care screenings were discussed with patient today  Appropriate education was printed on patient's after visit summary  Counseling:  Alcohol/drug use: discussed moderation in alcohol intake, the recommendations for healthy alcohol use, and avoidance of illicit drug use  Dental Health: discussed importance of regular tooth brushing, flossing, and dental visits  Injury prevention: discussed safety/seat belts, safety helmets, smoke detectors, carbon dioxide detectors, and smoking near bedding or upholstery  · Exercise: the importance of regular exercise/physical activity was discussed  Recommend exercise 3-5 times per week for at least 30 minutes  BMI Counseling: Body mass index is 39 13 kg/m²  The BMI is above normal  Nutrition recommendations include decreasing portion sizes, encouraging healthy choices of fruits and vegetables, decreasing fast food intake, consuming healthier snacks, moderation in carbohydrate intake and increasing intake of lean protein  Exercise recommendations include moderate physical activity 150 minutes/week  No follow-ups on file       Chief Complaint:     Chief Complaint   Patient presents with    Physical Exam      History of Present Illness:     Adult Annual Physical   Patient here for a comprehensive physical exam  The patient reports no problems  She just had a large iced coffee on the way here and rushed to get here, why BP and HR are a bit elevated  Diet and Physical Activity  · Diet/Nutrition: poor diet, frequent junk food and consuming 3-5 servings of fruits/vegetables daily  · Exercise: no formal exercise  Depression Screening  PHQ-9 Depression Screening    PHQ-9:   Frequency of the following problems over the past two weeks:      Little interest or pleasure in doing things: 0 - not at all  Feeling down, depressed, or hopeless: 0 - not at all  PHQ-2 Score: 0       General Health  · Sleep: sleeps well  · Hearing: normal - bilateral   · Vision: no vision problems and wears glasses  · Dental: regular dental visits and brushes teeth twice daily  /GYN Health  · Last menstrual period: 1/20/2021  · Contraceptive method: oral contraceptives  Review of Systems:     Review of Systems   Constitutional: Negative for chills and fever  HENT: Negative for hearing loss  Eyes: Negative for visual disturbance  Respiratory: Negative for cough and shortness of breath  Cardiovascular: Negative for chest pain, palpitations and leg swelling  Gastrointestinal: Negative for abdominal pain, blood in stool, constipation and diarrhea  Genitourinary: Negative for decreased urine volume, difficulty urinating and dysuria  Skin: Negative for rash  Neurological: Negative for dizziness, light-headedness and headaches  Psychiatric/Behavioral: Negative for sleep disturbance        Past Medical History:     Past Medical History:   Diagnosis Date    Allergic rhinitis     Depression     Obesity     Oral contraceptive use     Vitamin D deficiency disease       Past Surgical History:     Past Surgical History:   Procedure Laterality Date    WISDOM TOOTH EXTRACTION        Social History:     E-Cigarette/Vaping    E-Cigarette Use Never User      E-Cigarette/Vaping Substances    Nicotine No  THC No     CBD No     Flavoring No     Other No     Unknown No      Social History     Socioeconomic History    Marital status: Single     Spouse name: Not on file    Number of children: Not on file    Years of education: Not on file    Highest education level: Not on file   Occupational History    Not on file   Social Needs    Financial resource strain: Not on file    Food insecurity     Worry: Not on file     Inability: Not on file    Transportation needs     Medical: Not on file     Non-medical: Not on file   Tobacco Use    Smoking status: Never Smoker    Smokeless tobacco: Never Used   Substance and Sexual Activity    Alcohol use: Yes     Frequency: 2-4 times a month     Comment: Social    Drug use: No    Sexual activity: Yes   Lifestyle    Physical activity     Days per week: Not on file     Minutes per session: Not on file    Stress: Not on file   Relationships    Social connections     Talks on phone: Not on file     Gets together: Not on file     Attends Shinto service: Not on file     Active member of club or organization: Not on file     Attends meetings of clubs or organizations: Not on file     Relationship status: Not on file    Intimate partner violence     Fear of current or ex partner: Not on file     Emotionally abused: Not on file     Physically abused: Not on file     Forced sexual activity: Not on file   Other Topics Concern    Not on file   Social History Narrative    Single    Employed- Full Time    Lives at home with Parents       Family History:     Family History   Problem Relation Age of Onset    Hypertension Mother     Diabetes Father       Current Medications:     Current Outpatient Medications   Medication Sig Dispense Refill    buPROPion (WELLBUTRIN XL) 150 mg 24 hr tablet Take 1 tablet (150 mg total) by mouth daily 90 tablet 3    ergocalciferol (VITAMIN D2) 50,000 units Take 1 capsule (50,000 Units total) by mouth once a week 12 capsule 3    loratadine (CLARITIN) 10 mg tablet Take 10 mg by mouth daily      MELODETTA 24 FE 1-20 MG-MCG(24) CHEW Chew 1 tablet daily Chew 28 tablet 12     No current facility-administered medications for this visit  Allergies: Allergies   Allergen Reactions    Pollen Extract Allergic Rhinitis      Physical Exam:     /76   Pulse 104   Temp 98 4 °F (36 9 °C)   Ht 5' 9" (1 753 m)   Wt 120 kg (265 lb)   SpO2 100%   BMI 39 13 kg/m²     Physical Exam  Vitals signs reviewed  Constitutional:       General: She is not in acute distress  Appearance: Normal appearance  She is not ill-appearing, toxic-appearing or diaphoretic  HENT:      Head: Normocephalic and atraumatic  Neck:      Musculoskeletal: Normal range of motion and neck supple  No neck rigidity  Cardiovascular:      Rate and Rhythm: Normal rate and regular rhythm  Heart sounds: Normal heart sounds  No murmur  No friction rub  No gallop  Pulmonary:      Effort: Pulmonary effort is normal  No respiratory distress  Breath sounds: Normal breath sounds  No stridor  No wheezing or rhonchi  Abdominal:      General: There is no distension  Palpations: Abdomen is soft  Tenderness: There is no abdominal tenderness  There is no guarding or rebound  Skin:     General: Skin is warm  Findings: No erythema or rash  Neurological:      Mental Status: She is alert and oriented to person, place, and time  Motor: No weakness     Psychiatric:         Mood and Affect: Mood normal          Behavior: Behavior normal           DO Kendy Linda [address]

## 2021-01-21 NOTE — PATIENT INSTRUCTIONS

## 2021-03-31 DIAGNOSIS — Z23 ENCOUNTER FOR IMMUNIZATION: ICD-10-CM

## 2021-04-13 ENCOUNTER — TELEPHONE (OUTPATIENT)
Dept: INTERNAL MEDICINE CLINIC | Facility: CLINIC | Age: 31
End: 2021-04-13

## 2021-04-13 DIAGNOSIS — Z20.822 EXPOSURE TO COVID-19 VIRUS: Primary | ICD-10-CM

## 2021-04-19 ENCOUNTER — TELEPHONE (OUTPATIENT)
Dept: INTERNAL MEDICINE CLINIC | Facility: CLINIC | Age: 31
End: 2021-04-19

## 2021-04-19 NOTE — TELEPHONE ENCOUNTER
Pt hasn;t been seen here yet, had a negative co-vid test at [facility] on 4/16, but needs a return to work note  Not sure how this needs to be handled, since we don't have the actual results  Her parents tested positive  4/11 and she lives with them  Has an apt with Edmund Lucero on 05/18 to est here

## 2021-04-19 NOTE — TELEPHONE ENCOUNTER
If she can take a picture of her negative result and upload it into her my chart, and send it to one of us, I can write her note

## 2021-05-11 ENCOUNTER — IMMUNIZATIONS (OUTPATIENT)
Dept: FAMILY MEDICINE CLINIC | Facility: HOSPITAL | Age: 31
End: 2021-05-11

## 2021-05-11 DIAGNOSIS — Z23 ENCOUNTER FOR IMMUNIZATION: Primary | ICD-10-CM

## 2021-05-11 PROCEDURE — 91300 SARS-COV-2 / COVID-19 MRNA VACCINE (PFIZER-BIONTECH) 30 MCG: CPT

## 2021-05-11 PROCEDURE — 0001A SARS-COV-2 / COVID-19 MRNA VACCINE (PFIZER-BIONTECH) 30 MCG: CPT

## 2021-05-18 PROBLEM — Z00.00 ANNUAL PHYSICAL EXAM: Status: ACTIVE | Noted: 2021-05-18

## 2021-05-25 ENCOUNTER — APPOINTMENT (OUTPATIENT)
Dept: LAB | Facility: CLINIC | Age: 31
End: 2021-05-25
Payer: COMMERCIAL

## 2021-05-25 ENCOUNTER — OFFICE VISIT (OUTPATIENT)
Dept: FAMILY MEDICINE CLINIC | Facility: CLINIC | Age: 31
End: 2021-05-25
Payer: COMMERCIAL

## 2021-05-25 ENCOUNTER — TRANSCRIBE ORDERS (OUTPATIENT)
Dept: LAB | Facility: CLINIC | Age: 31
End: 2021-05-25

## 2021-05-25 VITALS
WEIGHT: 293 LBS | BODY MASS INDEX: 44.41 KG/M2 | OXYGEN SATURATION: 100 % | DIASTOLIC BLOOD PRESSURE: 76 MMHG | HEIGHT: 68 IN | SYSTOLIC BLOOD PRESSURE: 130 MMHG | TEMPERATURE: 98.1 F | HEART RATE: 103 BPM

## 2021-05-25 DIAGNOSIS — Z13.0 SCREENING FOR DEFICIENCY ANEMIA: ICD-10-CM

## 2021-05-25 DIAGNOSIS — Z13.220 SCREENING FOR HYPERLIPIDEMIA: ICD-10-CM

## 2021-05-25 DIAGNOSIS — Z13.1 SCREENING FOR DIABETES MELLITUS: ICD-10-CM

## 2021-05-25 DIAGNOSIS — Z13.31 POSITIVE DEPRESSION SCREENING: ICD-10-CM

## 2021-05-25 DIAGNOSIS — Z13.29 SCREENING FOR THYROID DISORDER: ICD-10-CM

## 2021-05-25 DIAGNOSIS — Z01.419 WOMEN'S ANNUAL ROUTINE GYNECOLOGICAL EXAMINATION: Primary | ICD-10-CM

## 2021-05-25 DIAGNOSIS — Z76.89 ENCOUNTER TO ESTABLISH CARE WITH NEW DOCTOR: ICD-10-CM

## 2021-05-25 DIAGNOSIS — F32.A DEPRESSION, UNSPECIFIED DEPRESSION TYPE: ICD-10-CM

## 2021-05-25 PROBLEM — R06.00 DYSPNEA ON EXERTION: Status: RESOLVED | Noted: 2018-05-30 | Resolved: 2021-05-25

## 2021-05-25 PROBLEM — R06.09 DYSPNEA ON EXERTION: Status: RESOLVED | Noted: 2018-05-30 | Resolved: 2021-05-25

## 2021-05-25 PROBLEM — B07.0 PLANTAR WART OF RIGHT FOOT: Status: RESOLVED | Noted: 2019-05-29 | Resolved: 2021-05-25

## 2021-05-25 PROBLEM — M94.0 COSTOCHONDRITIS: Status: RESOLVED | Noted: 2018-05-30 | Resolved: 2021-05-25

## 2021-05-25 LAB
ALBUMIN SERPL BCP-MCNC: 3.4 G/DL (ref 3.5–5)
ALP SERPL-CCNC: 52 U/L (ref 46–116)
ALT SERPL W P-5'-P-CCNC: 29 U/L (ref 12–78)
ANION GAP SERPL CALCULATED.3IONS-SCNC: 3 MMOL/L (ref 4–13)
AST SERPL W P-5'-P-CCNC: 15 U/L (ref 5–45)
BASOPHILS # BLD AUTO: 0.06 THOUSANDS/ΜL (ref 0–0.1)
BASOPHILS NFR BLD AUTO: 1 % (ref 0–1)
BILIRUB SERPL-MCNC: 0.27 MG/DL (ref 0.2–1)
BUN SERPL-MCNC: 10 MG/DL (ref 5–25)
CALCIUM ALBUM COR SERPL-MCNC: 10.1 MG/DL (ref 8.3–10.1)
CALCIUM SERPL-MCNC: 9.6 MG/DL (ref 8.3–10.1)
CHLORIDE SERPL-SCNC: 112 MMOL/L (ref 100–108)
CHOLEST SERPL-MCNC: 199 MG/DL (ref 50–200)
CO2 SERPL-SCNC: 25 MMOL/L (ref 21–32)
CREAT SERPL-MCNC: 0.79 MG/DL (ref 0.6–1.3)
EOSINOPHIL # BLD AUTO: 0.11 THOUSAND/ΜL (ref 0–0.61)
EOSINOPHIL NFR BLD AUTO: 2 % (ref 0–6)
ERYTHROCYTE [DISTWIDTH] IN BLOOD BY AUTOMATED COUNT: 14.1 % (ref 11.6–15.1)
GFR SERPL CREATININE-BSD FRML MDRD: 101 ML/MIN/1.73SQ M
GLUCOSE P FAST SERPL-MCNC: 88 MG/DL (ref 65–99)
HCT VFR BLD AUTO: 46.2 % (ref 34.8–46.1)
HDLC SERPL-MCNC: 46 MG/DL
HGB BLD-MCNC: 14.3 G/DL (ref 11.5–15.4)
IMM GRANULOCYTES # BLD AUTO: 0.03 THOUSAND/UL (ref 0–0.2)
IMM GRANULOCYTES NFR BLD AUTO: 0 % (ref 0–2)
LDLC SERPL CALC-MCNC: 94 MG/DL (ref 0–100)
LYMPHOCYTES # BLD AUTO: 2.02 THOUSANDS/ΜL (ref 0.6–4.47)
LYMPHOCYTES NFR BLD AUTO: 29 % (ref 14–44)
MCH RBC QN AUTO: 24.9 PG (ref 26.8–34.3)
MCHC RBC AUTO-ENTMCNC: 31 G/DL (ref 31.4–37.4)
MCV RBC AUTO: 81 FL (ref 82–98)
MONOCYTES # BLD AUTO: 0.54 THOUSAND/ΜL (ref 0.17–1.22)
MONOCYTES NFR BLD AUTO: 8 % (ref 4–12)
NEUTROPHILS # BLD AUTO: 4.25 THOUSANDS/ΜL (ref 1.85–7.62)
NEUTS SEG NFR BLD AUTO: 60 % (ref 43–75)
NRBC BLD AUTO-RTO: 0 /100 WBCS
PLATELET # BLD AUTO: 290 THOUSANDS/UL (ref 149–390)
PMV BLD AUTO: 10.3 FL (ref 8.9–12.7)
POTASSIUM SERPL-SCNC: 4.1 MMOL/L (ref 3.5–5.3)
PROT SERPL-MCNC: 7.2 G/DL (ref 6.4–8.2)
RBC # BLD AUTO: 5.74 MILLION/UL (ref 3.81–5.12)
SODIUM SERPL-SCNC: 140 MMOL/L (ref 136–145)
TRIGL SERPL-MCNC: 297 MG/DL
TSH SERPL DL<=0.05 MIU/L-ACNC: 1.24 UIU/ML (ref 0.36–3.74)
WBC # BLD AUTO: 7.01 THOUSAND/UL (ref 4.31–10.16)

## 2021-05-25 PROCEDURE — 84443 ASSAY THYROID STIM HORMONE: CPT

## 2021-05-25 PROCEDURE — 3725F SCREEN DEPRESSION PERFORMED: CPT | Performed by: NURSE PRACTITIONER

## 2021-05-25 PROCEDURE — 80053 COMPREHEN METABOLIC PANEL: CPT

## 2021-05-25 PROCEDURE — 36415 COLL VENOUS BLD VENIPUNCTURE: CPT

## 2021-05-25 PROCEDURE — 85025 COMPLETE CBC W/AUTO DIFF WBC: CPT

## 2021-05-25 PROCEDURE — 99213 OFFICE O/P EST LOW 20 MIN: CPT | Performed by: NURSE PRACTITIONER

## 2021-05-25 PROCEDURE — 80061 LIPID PANEL: CPT

## 2021-05-25 RX ORDER — BUPROPION HYDROCHLORIDE 150 MG/1
150 TABLET ORAL DAILY
Qty: 90 TABLET | Refills: 3 | Status: SHIPPED | OUTPATIENT
Start: 2021-05-25 | End: 2021-06-17 | Stop reason: ALTCHOICE

## 2021-05-25 NOTE — PATIENT INSTRUCTIONS
Calm is an jose - this has soothing background sounds like ocean sounds and can talk you through meditation to help you sleep  This jose also has bedtime stories  [employer] is an jose to help with meditation and relaxation       "sleep with me" is a podcast you can try -a nice boring voice telling you a boring story to help you sleep  Sleep Hygiene Tips  The most common cause of insomnia is a change in your daily routine  For example,  traveling, change in work hours, disruption of other behaviors (eating, exercise, leisure,  etc ), and relationship conflicts can all cause sleep problems  Paying attention to good  sleep hygiene is the most important thing you can do to maintain good sleep  Do:  1  Go to bed at the same time each day  2  Get up from bed at the same time each day  3  Get regular exercise each day, preferably in the morning  There is good evidence that  regular exercise improves restful sleep  This includes stretching and aerobic exercise  4  Get regular exposure to outdoor or bright lights, especially in the late afternoon  5  Keep the temperature in your bedroom comfortable  6  Keep the bedroom quiet when sleeping  7  Keep the bedroom dark enough to facilitate sleep  8  Use your bed only for sleep and sex  9  Take medications as directed  It is helpful to take prescribed sleeping pills 1 hour  before bedtime, so they are causing drowsiness when you lie down, or 10 hours  before getting up, to avoid daytime drowsiness  10  Use a relaxation exercise just before going to sleep   o Muscle relaxation, imagery, massage, warm bath, etc   11  Keep your feet and hands warm  Wear warm socks and/or mittens or gloves to bed  Don't:  1  Exercise just before going to bed    2  Engage in stimulating activity just before bed, such as playing a competitive game,  watching an exciting program on television or movie, or having an important  discussion with a loved one   3  Have caffeine in the evening (coffee, many teas, chocolate, sodas, etc )   4  Read or watch television in bed  5  Use alcohol to help you sleep  6  Go to bed too hungry or too full  7  Take another person's sleeping pills  8  Take over-the-counter sleeping pills, without your doctor's knowledge  Tolerance can  develop rapidly with these medications  Diphenhydramine (an ingredient commonly  found in over-the-counter sleep meds) can have serious side effects for elderly patients  9  Take daytime naps  10  Command yourself to go to sleep  This only makes your mind and body more alert  If you lie in bed awake for more than 20-30 minutes, get up, go to a different room (or different part of the bedroom), participate in a quiet activity (e g  non-excitable reading or television),and then return to bed when you feel sleepy  Do this as many times during the night as needed  Weight Management   AMBULATORY CARE:   Why it is important to manage your weight:  Being overweight increases your risk of health conditions such as heart disease, high blood pressure, type 2 diabetes, and certain types of cancer  It can also increase your risk for osteoarthritis, sleep apnea, and other respiratory problems  Aim for a slow, steady weight loss  Even a small amount of weight loss can lower your risk of health problems  How to lose weight safely:  A safe and healthy way to lose weight is to eat fewer calories and get regular exercise  · You can lose up about 1 pound a week by decreasing the number of calories you eat by 500 calories each day  You can decrease calories by eating smaller portion sizes or by cutting out high-calorie foods  Read labels to find out how many calories are in the foods you eat  · You can also burn calories with exercise such as walking, swimming, or biking  You will be more likely to keep weight off if you make these changes part of your lifestyle  Exercise at least 30 minutes per day on most days of the week  You can also fit in more physical activity by taking the stairs instead of the elevator or parking farther away from stores  Ask your healthcare provider about the best exercise plan for you  Healthy meal plan for weight management:  A healthy meal plan includes a variety of foods, contains fewer calories, and helps you stay healthy  A healthy meal plan includes the following:     · Eat whole-grain foods more often  A healthy meal plan should contain fiber  Fiber is the part of grains, fruits, and vegetables that is not broken down by your body  Whole-grain foods are healthy and provide extra fiber in your diet  Some examples of whole-grain foods are whole-wheat breads and pastas, oatmeal, brown rice, and bulgur  · Eat a variety of vegetables every day  Include dark, leafy greens such as spinach, kale, genaro greens, and mustard greens  Eat yellow and orange vegetables such as carrots, sweet potatoes, and winter squash  · Eat a variety of fruits every day  Choose fresh or canned fruit (canned in its own juice or light syrup) instead of juice  Fruit juice has very little or no fiber  · Eat low-fat dairy foods  Drink fat-free (skim) milk or 1% milk  Eat fat-free yogurt and low-fat cottage cheese  Try low-fat cheeses such as mozzarella and other reduced-fat cheeses  · Choose meat and other protein foods that are low in fat  Choose beans or other legumes such as split peas or lentils  Choose fish, skinless poultry (chicken or turkey), or lean cuts of red meat (beef or pork)  Before you cook meat or poultry, cut off any visible fat  · Use less fat and oil  Try baking foods instead of frying them  Add less fat, such as margarine, sour cream, regular salad dressing and mayonnaise to foods  Eat fewer high-fat foods  Some examples of high-fat foods include french fries, doughnuts, ice cream, and cakes  · Eat fewer sweets  Limit foods and drinks that are high in sugar   This includes candy, cookies, regular soda, and sweetened drinks  Ways to decrease calories:   · Eat smaller portions  ? Use a small plate with smaller servings  ? Do not eat second helpings  ? When you eat at a restaurant, ask for a box and place half of your meal in the box before you eat  ? Share an entrée with someone else  · Replace high-calorie snacks with healthy, low-calorie snacks  ? Choose fresh fruit, vegetables, fat-free rice cakes, or air-popped popcorn instead of potato chips, nuts, or chocolate  ? Choose water or calorie-free drinks instead of soda or sweetened drinks  · Do not shop for groceries when you are hungry  You may be more likely to make unhealthy food choices  Take a grocery list of healthy foods and shop after you have eaten  · Eat regular meals  Do not skip meals  Skipping meals can lead to overeating later in the day  This can make it harder for you to lose weight  Eat a healthy snack in place of a meal if you do not have time to eat a regular meal  Talk with a dietitian to help you create a meal plan and schedule that is right for you  Other things to consider as you try to lose weight:   · Be aware of situations that may give you the urge to overeat, such as eating while watching television  Find ways to avoid these situations  For example, read a book, go for a walk, or do crafts  · Meet with a weight loss support group or friends who are also trying to lose weight  This may help you stay motivated to continue working on your weight loss goals  © Copyright [address] Information is for End User's use only and may not be sold, redistributed or otherwise used for commercial purposes  All illustrations and images included in CareNotes® are the copyrighted property of [employer]  or [facility] Maria Luisa Trinidad   The above information is an  only  It is not intended as medical advice for individual conditions or treatments   Talk to your doctor, nurse or pharmacist before following any medical regimen to see if it is safe and effective for you

## 2021-05-25 NOTE — PROGRESS NOTES
Assessment/Plan:    Problem List Items Addressed This Visit     BMI 45 0-49 9, adult ([facility] )      Other Visit Diagnoses     Women's annual routine gynecological examination    -  Primary    Relevant Orders    Ambulatory referral to Gynecology    Encounter to establish care with new doctor        Depression, unspecified depression type        Pt reports much improvement with Wellbutrin -will continue    Relevant Medications    buPROPion (WELLBUTRIN XL) 150 mg 24 hr tablet    Screening for thyroid disorder        Relevant Orders    TSH, 3rd generation with Free T4 reflex    Screening for hyperlipidemia        Relevant Orders    Lipid Panel with Direct LDL reflex    Screening for deficiency anemia        Relevant Orders    CBC and differential    Screening for diabetes mellitus        Relevant Orders    Comprehensive metabolic panel    Positive depression screening               Diagnoses and all orders for this visit:    Women's annual routine gynecological examination  -     Ambulatory referral to Gynecology; Future    Encounter to establish care with new doctor    BMI 45 0-49 9, adult ([facility] )    Depression, unspecified depression type  Comments:  Pt reports much improvement with Wellbutrin -will continue  Orders:  -     buPROPion (WELLBUTRIN XL) 150 mg 24 hr tablet; Take 1 tablet (150 mg total) by mouth daily    Screening for thyroid disorder  -     TSH, 3rd generation with Free T4 reflex; Future    Screening for hyperlipidemia  -     Lipid Panel with Direct LDL reflex; Future    Screening for deficiency anemia  -     CBC and differential; Future    Screening for diabetes mellitus  -     Comprehensive metabolic panel; Future    Positive depression screening        No problem-specific Assessment & Plan notes found for this encounter  Subjective:      Patient ID: Sadie Anthony is a 28 y o  female  Presents today to establish care at this office and routine visit  Previous PCP Tono Presume      Pt would like to reduce Wellbutrin XL  She does report that the medication has been effective in Tx of depression  Recent PHQ is noted to be positive  Pt is agreeable to continue with current dosing  At this time no medicaiton changes R/T depression  Depression  This is a chronic problem  The symptoms are aggravated by stress (lack of sleeping)  Treatments tried: wellbutrin XL  The treatment provided moderate relief  The following portions of the patient's history were reviewed and updated as appropriate:   She has a past medical history of Allergic rhinitis, Costochondritis (5/30/2018), Dyspnea on exertion (5/30/2018), Obesity, Oral contraceptive use, and Plantar wart of right foot (5/29/2019)  ,  does not have any pertinent problems on file  ,   has a past surgical history that includes [place] tooth extraction  ,  family history includes Diabetes in her father; Hypertension in her mother  ,   reports that she has never smoked  She has never used smokeless tobacco  She reports current alcohol use  She reports that she does not use drugs  ,  is allergic to pollen extract     Current Outpatient Medications   Medication Sig Dispense Refill    buPROPion (WELLBUTRIN XL) 150 mg 24 hr tablet Take 1 tablet (150 mg total) by mouth daily 90 tablet 3    loratadine (CLARITIN) 10 mg tablet Take 10 mg by mouth daily      MELODETTA 24 FE 1-20 MG-MCG(24) CHEW Chew 1 tablet daily Chew 28 tablet 12     No current facility-administered medications for this visit  BMI Counseling: Body mass index is 45 31 kg/m²  The BMI is above normal  Nutrition recommendations include decreasing portion sizes, consuming healthier snacks, limiting drinks that contain sugar, moderation in carbohydrate intake and reducing intake of cholesterol  Exercise recommendations include exercising 3-5 times per week  No pharmacotherapy was ordered         Depression Screening and Follow-up Plan: Patient's depression screening was positive with a PHQ-2 score of 2  Their PHQ-9 score was 7  Patient assessed for underlying major depression  Brief counseling provided and recommend additional follow-up/re-evaluation next office visit  Review of Systems   Psychiatric/Behavioral: Positive for depression and sleep disturbance  All other systems reviewed and are negative  Objective:  Vitals:    05/25/21 0915   BP: 130/76   BP Location: Left arm   Patient Position: Sitting   Cuff Size: Large   Pulse: 103   Temp: 98 1 °F (36 7 °C)   TempSrc: Tympanic   SpO2: 100%   Weight: 135 kg (298 lb)   Height: 5' 8" (1 727 m)     Body mass index is 45 31 kg/m²  Physical Exam  Vitals signs and nursing note reviewed  Constitutional:       Appearance: Normal appearance  She is well-developed  She is obese  HENT:      Head: Normocephalic and atraumatic  Right Ear: Tympanic membrane, ear canal and external ear normal       Left Ear: Tympanic membrane, ear canal and external ear normal       Nose: Nose normal       Mouth/Throat:      Mouth: Mucous membranes are moist       Pharynx: Uvula midline  Eyes:      General: Lids are normal       Conjunctiva/sclera: Conjunctivae normal       Pupils: Pupils are equal, round, and reactive to light  Neck:      Musculoskeletal: Full passive range of motion without pain, normal range of motion and neck supple  Thyroid: No thyroid mass or thyromegaly  Vascular: No JVD  Trachea: Trachea and phonation normal    Cardiovascular:      Rate and Rhythm: Normal rate and regular rhythm  Pulses: Normal pulses  Heart sounds: Normal heart sounds, S1 normal and S2 normal  No murmur  No friction rub  No gallop  Pulmonary:      Effort: Pulmonary effort is normal       Breath sounds: Normal breath sounds  Abdominal:      General: Bowel sounds are normal       Palpations: Abdomen is soft  Tenderness: There is no abdominal tenderness     Genitourinary:     Comments: Deferred   Musculoskeletal: Normal range of motion  Right lower leg: No edema  Left lower leg: No edema  Lymphadenopathy:      Head:      Right side of head: No submental, submandibular, tonsillar, preauricular, posterior auricular or occipital adenopathy  Left side of head: No submental, submandibular, tonsillar, preauricular, posterior auricular or occipital adenopathy  Cervical: No cervical adenopathy  Skin:     General: Skin is warm and dry  Capillary Refill: Capillary refill takes less than 2 seconds  Neurological:      General: No focal deficit present  Mental Status: She is alert and oriented to person, place, and time  Cranial Nerves: Cranial nerves are intact  No cranial nerve deficit  Sensory: Sensation is intact  Motor: Motor function is intact  Coordination: Coordination is intact  Gait: Gait is intact  Deep Tendon Reflexes: Reflexes are normal and symmetric  Psychiatric:         Attention and Perception: Attention and perception normal          Mood and Affect: Mood and affect normal          Speech: Speech normal          Behavior: Behavior normal  Behavior is cooperative  Thought Content:  Thought content normal          Cognition and Memory: Cognition normal          Judgment: Judgment normal

## 2021-06-03 ENCOUNTER — IMMUNIZATIONS (OUTPATIENT)
Dept: FAMILY MEDICINE CLINIC | Facility: HOSPITAL | Age: 31
End: 2021-06-03

## 2021-06-03 DIAGNOSIS — Z30.41 ENCOUNTER FOR SURVEILLANCE OF CONTRACEPTIVE PILLS: ICD-10-CM

## 2021-06-03 DIAGNOSIS — Z23 ENCOUNTER FOR IMMUNIZATION: Primary | ICD-10-CM

## 2021-06-03 PROCEDURE — 91300 SARS-COV-2 / COVID-19 MRNA VACCINE (PFIZER-BIONTECH) 30 MCG: CPT

## 2021-06-03 PROCEDURE — 0002A SARS-COV-2 / COVID-19 MRNA VACCINE (PFIZER-BIONTECH) 30 MCG: CPT

## 2021-06-03 RX ORDER — NORETHINDRONE ACETATE AND ETHINYL ESTRADIOL AND FERROUS FUMARATE 1MG-20(24)
KIT ORAL
Qty: 28 TABLET | Refills: 1 | Status: SHIPPED | OUTPATIENT
Start: 2021-06-03 | End: 2021-06-17 | Stop reason: ALTCHOICE

## 2021-06-08 ENCOUNTER — OFFICE VISIT (OUTPATIENT)
Dept: FAMILY MEDICINE CLINIC | Facility: CLINIC | Age: 31
End: 2021-06-08
Payer: COMMERCIAL

## 2021-06-08 VITALS
WEIGHT: 293 LBS | BODY MASS INDEX: 44.41 KG/M2 | SYSTOLIC BLOOD PRESSURE: 116 MMHG | DIASTOLIC BLOOD PRESSURE: 70 MMHG | HEART RATE: 98 BPM | TEMPERATURE: 98.1 F | OXYGEN SATURATION: 98 % | HEIGHT: 68 IN

## 2021-06-08 DIAGNOSIS — F32.9 REACTIVE DEPRESSION: ICD-10-CM

## 2021-06-08 DIAGNOSIS — T80.89XA INJECTION SITE IRRITATION, INITIAL ENCOUNTER: ICD-10-CM

## 2021-06-08 DIAGNOSIS — E78.1 HYPERTRIGLYCERIDEMIA: Primary | ICD-10-CM

## 2021-06-08 PROCEDURE — 99213 OFFICE O/P EST LOW 20 MIN: CPT | Performed by: NURSE PRACTITIONER

## 2021-06-08 PROCEDURE — 1036F TOBACCO NON-USER: CPT | Performed by: NURSE PRACTITIONER

## 2021-06-08 NOTE — PROGRESS NOTES
Assessment/Plan:    Problem List Items Addressed This Visit     BMI 45 0-49 9, adult ([facility] )    Reactive depression    Injection site irritation, initial encounter      Other Visit Diagnoses     Hypertriglyceridemia    -  Primary           Diagnoses and all orders for this visit:    Hypertriglyceridemia    BMI 45 0-49 9, adult ([facility] )    Reactive depression  Comments:  Continues with Wellbutrin at 150mg daily  Injection site irritation, initial encounter        No problem-specific Assessment & Plan notes found for this encounter  Subjective:      Patient ID: Helga Key is a 28 y o  female  Presents to discuss serology result from 5/25/2021  The following portions of the patient's history were reviewed and updated as appropriate:   She has a past medical history of Allergic rhinitis, Costochondritis (5/30/2018), Dyspnea on exertion (5/30/2018), Obesity, Oral contraceptive use, and Plantar wart of right foot (5/29/2019)  ,  does not have any pertinent problems on file  ,   has a past surgical history that includes [place] tooth extraction  ,  family history includes Diabetes in her father; Hypertension in her mother  ,   reports that she has never smoked  She has never used smokeless tobacco  She reports current alcohol use  She reports that she does not use drugs  ,  is allergic to pollen extract     Current Outpatient Medications   Medication Sig Dispense Refill    buPROPion (WELLBUTRIN XL) 150 mg 24 hr tablet Take 1 tablet (150 mg total) by mouth daily 90 tablet 3    Patricia 24 Fe 1-20 MG-MCG(24) CHEW CHEW 1 TABLET BY MOUTH EVERY DAY 28 tablet 1    loratadine (CLARITIN) 10 mg tablet Take 10 mg by mouth daily       No current facility-administered medications for this visit  BMI Counseling: Body mass index is 45 77 kg/m²   The BMI is above normal  Nutrition recommendations include decreasing portion sizes, consuming healthier snacks, limiting drinks that contain sugar, moderation in carbohydrate intake and reducing intake of cholesterol  Exercise recommendations include exercising 3-5 times per week  Review of Systems   Constitutional: Negative  HENT: Negative  Eyes: Negative  Respiratory: Negative  Cardiovascular: Negative  Gastrointestinal: Negative  Endocrine: Negative  Genitourinary: Negative  Musculoskeletal: Negative  Skin: Positive for color change (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)  Allergic/Immunologic: Negative  Neurological: Negative  Hematological: Negative  Psychiatric/Behavioral: Negative  Objective:  Vitals:    06/08/21 0921   BP: 116/70   BP Location: Left arm   Patient Position: Sitting   Cuff Size: Large   Pulse: 98   Temp: 98 1 °F (36 7 °C)   TempSrc: Tympanic   SpO2: 98%   Weight: (!) 137 kg (301 lb)   Height: 5' 8" (1 727 m)     Body mass index is 45 77 kg/m²  Physical Exam  Vitals signs and nursing note reviewed  Constitutional:       Appearance: Normal appearance  She is well-developed  She is obese  HENT:      Head: Normocephalic and atraumatic  Right Ear: Tympanic membrane, ear canal and external ear normal       Left Ear: Tympanic membrane, ear canal and external ear normal       Nose: Nose normal       Mouth/Throat:      Mouth: Mucous membranes are moist       Pharynx: Uvula midline  Eyes:      General: Lids are normal       Conjunctiva/sclera: Conjunctivae normal       Pupils: Pupils are equal, round, and reactive to light  Neck:      Musculoskeletal: Full passive range of motion without pain, normal range of motion and neck supple  Thyroid: No thyroid mass or thyromegaly  Vascular: No JVD  Trachea: Trachea and phonation normal    Cardiovascular:      Rate and Rhythm: Normal rate and regular rhythm  Pulses: Normal pulses  Heart sounds: Normal heart sounds, S1 normal and S2 normal  No murmur  No friction rub  No gallop      Pulmonary:      Effort: Pulmonary effort is normal       Breath sounds: Normal breath sounds  Abdominal:      General: Bowel sounds are normal       Palpations: Abdomen is soft  Tenderness: There is no abdominal tenderness  Genitourinary:     Comments: Deferred   Musculoskeletal: Normal range of motion  Right lower leg: No edema  Left lower leg: No edema  Lymphadenopathy:      Head:      Right side of head: No submental, submandibular, tonsillar, preauricular, posterior auricular or occipital adenopathy  Left side of head: No submental, submandibular, tonsillar, preauricular, posterior auricular or occipital adenopathy  Cervical: No cervical adenopathy  Skin:     General: Skin is warm and dry  Capillary Refill: Capillary refill takes less than 2 seconds  Findings: Erythema present  Neurological:      General: No focal deficit present  Mental Status: She is alert and oriented to person, place, and time  Cranial Nerves: Cranial nerves are intact  No cranial nerve deficit  Sensory: Sensation is intact  Motor: Motor function is intact  Coordination: Coordination is intact  Gait: Gait is intact  Deep Tendon Reflexes: Reflexes are normal and symmetric  Psychiatric:         Attention and Perception: Attention and perception normal          Mood and Affect: Mood and affect normal          Speech: Speech normal          Behavior: Behavior normal  Behavior is cooperative  Thought Content:  Thought content normal          Cognition and Memory: Cognition normal          Judgment: Judgment normal            Appointment on 05/25/2021   Component Date Value Ref Range Status    Cholesterol 05/25/2021 199  50 - 200 mg/dL Final    Cholesterol:       Desirable         <200 mg/dl       Borderline         200-239 mg/dl       High              >239           Triglycerides 05/25/2021 297* <=150 mg/dL Final    Triglyceride:     Normal          <150 mg/dl     Borderline High 150-199 mg/dl     High            200-499 mg/dl        Very High       >499 mg/dl    Specimen collection should occur prior to N-Acetylcysteine or Metamizole administration due to the potential for falsely depressed results   HDL, Direct 05/25/2021 46  >=40 mg/dL Final    HDL Cholesterol:       Low     <41 mg/dL    LDL Calculated 05/25/2021 94  0 - 100 mg/dL Final    This screening LDL is a calculated result  It does not have the accuracy of the Direct Measured LDL in the monitoring of patients with hyperlipidemia and/or statin therapy  Direct Measure LDL (OGZ119) must be ordered separately in these patients    LDL Cholesterol:     Optimal           <100 mg/dl     Near Optimal      100-129 mg/dl     Above Optimal       Borderline High 130-159 mg/dl       High            160-189 mg/dl       Very High       >189 mg/dl           WBC 05/25/2021 7 01  4 31 - 10 16 Thousand/uL Final    RBC 05/25/2021 5 74* 3 81 - 5 12 Million/uL Final    Hemoglobin 05/25/2021 14 3  11 5 - 15 4 g/dL Final    Hematocrit 05/25/2021 46 2* 34 8 - 46 1 % Final    MCV 05/25/2021 81* 82 - 98 fL Final    MCH 05/25/2021 24 9* 26 8 - 34 3 pg Final    MCHC 05/25/2021 31 0* 31 4 - 37 4 g/dL Final    RDW 05/25/2021 14 1  11 6 - 15 1 % Final    MPV 05/25/2021 10 3  8 9 - 12 7 fL Final    Platelets 07/88/4518 290  149 - 390 Thousands/uL Final    nRBC 05/25/2021 0  /100 WBCs Final    Neutrophils Relative 05/25/2021 60  43 - 75 % Final    Immat GRANS % 05/25/2021 0  0 - 2 % Final    Lymphocytes Relative 05/25/2021 29  14 - 44 % Final    Monocytes Relative 05/25/2021 8  4 - 12 % Final    Eosinophils Relative 05/25/2021 2  0 - 6 % Final    Basophils Relative 05/25/2021 1  0 - 1 % Final    Neutrophils Absolute 05/25/2021 4 25  1 85 - 7 62 Thousands/µL Final    Immature Grans Absolute 05/25/2021 0 03  0 00 - 0 20 Thousand/uL Final    Lymphocytes Absolute 05/25/2021 2 02  0 60 - 4 47 Thousands/µL Final    Monocytes Absolute 05/25/2021 0 54 0 17 - 1 22 Thousand/µL Final    Eosinophils Absolute 05/25/2021 0 11  0 00 - 0 61 Thousand/µL Final    Basophils Absolute 05/25/2021 0 06  0 00 - 0 10 Thousands/µL Final    Sodium 05/25/2021 140  136 - 145 mmol/L Final    Potassium 05/25/2021 4 1  3 5 - 5 3 mmol/L Final    Chloride 05/25/2021 112* 100 - 108 mmol/L Final    CO2 05/25/2021 25  21 - 32 mmol/L Final    ANION GAP 05/25/2021 3* 4 - 13 mmol/L Final    BUN 05/25/2021 10  5 - 25 mg/dL Final    Creatinine 05/25/2021 0 79  0 60 - 1 30 mg/dL Final    Standardized to IDMS reference method    Glucose, Fasting 05/25/2021 88  65 - 99 mg/dL Final    Specimen collection should occur prior to Sulfasalazine administration due to the potential for falsely depressed results  Specimen collection should occur prior to Sulfapyridine administration due to the potential for falsely elevated results   Calcium 05/25/2021 9 6  8 3 - 10 1 mg/dL Final    Corrected Calcium 05/25/2021 10 1  8 3 - 10 1 mg/dL Final    AST 05/25/2021 15  5 - 45 U/L Final    Specimen collection should occur prior to Sulfasalazine administration due to the potential for falsely depressed results   ALT 05/25/2021 29  12 - 78 U/L Final    Specimen collection should occur prior to Sulfasalazine and/or Sulfapyridine administration due to the potential for falsely depressed results   Alkaline Phosphatase 05/25/2021 52  46 - 116 U/L Final    Total Protein 05/25/2021 7 2  6 4 - 8 2 g/dL Final    Albumin 05/25/2021 3 4* 3 5 - 5 0 g/dL Final    Total Bilirubin 05/25/2021 0 27  0 20 - 1 00 mg/dL Final    Use of this assay is not recommended for patients undergoing treatment with eltrombopag due to the potential for falsely elevated results      eGFR 05/25/2021 101  ml/min/1 73sq m Final    TSH 3RD GENERATON 05/25/2021 1 240  0 358 - 3 740 uIU/mL Final    The recommended reference ranges for TSH during pregnancy are as follows:   First trimester 0 1 to 2 5 uIU/mL   Second trimester  0 2 to 3 0 uIU/mL   Third trimester 0 3 to 3 0 uIU/m    Note: Normal ranges may not apply to patients who are transgender, non-binary, or whose legal sex, sex at birth, and gender identity differ

## 2021-06-17 ENCOUNTER — OFFICE VISIT (OUTPATIENT)
Dept: OBGYN CLINIC | Facility: CLINIC | Age: 31
End: 2021-06-17
Payer: COMMERCIAL

## 2021-06-17 VITALS
BODY MASS INDEX: 44.41 KG/M2 | SYSTOLIC BLOOD PRESSURE: 122 MMHG | WEIGHT: 293 LBS | HEIGHT: 68 IN | DIASTOLIC BLOOD PRESSURE: 82 MMHG

## 2021-06-17 DIAGNOSIS — Z30.41 ENCOUNTER FOR SURVEILLANCE OF CONTRACEPTIVE PILLS: ICD-10-CM

## 2021-06-17 DIAGNOSIS — Z01.419 WOMEN'S ANNUAL ROUTINE GYNECOLOGICAL EXAMINATION: ICD-10-CM

## 2021-06-17 DIAGNOSIS — Z01.419 ENCOUNTER FOR ANNUAL ROUTINE GYNECOLOGICAL EXAMINATION: Primary | ICD-10-CM

## 2021-06-17 PROCEDURE — 3008F BODY MASS INDEX DOCD: CPT | Performed by: NURSE PRACTITIONER

## 2021-06-17 PROCEDURE — S0610 ANNUAL GYNECOLOGICAL EXAMINA: HCPCS | Performed by: NURSE PRACTITIONER

## 2021-06-17 RX ORDER — DROSPIRENONE AND ETHINYL ESTRADIOL 0.03MG-3MG
1 KIT ORAL DAILY
Qty: 90 TABLET | Refills: 3 | Status: SHIPPED | OUTPATIENT
Start: 2021-06-17 | End: 2022-04-12 | Stop reason: SDUPTHER

## 2021-06-17 NOTE — PROGRESS NOTES
Diagnoses and all orders for this visit:    1  Encounter for annual routine gynecological examination    Safe sex practices and condom use encouraged  Healthy eating and nutrition, daily exercise discussed and SBE reinforced  Call with any issues and all questions and concerns addressed  2  Encounter for surveillance of contraceptive pills  Switched to [employer]  Reviewed ACHES and not to miss any and take the same time daily  If irregular spotting continues after 3 months, pt instructed to call  All questions and concerns answered  Patient to call with any questions  Pleasant 28 y o  nulliparous, premenopausal female here for new patient annual exam   Reports irregular cycles on OCP  Denies history of abnormal pap smears  Denies vaginal, urinary or breast issues, today  Denies pelvic pain  Some issues with her BCM, which is generic Melodetta, so will switch to higher estrogen level  Family history of Thalassemia  Pt mentioned having "pink" red blood cells  No other concerns  Sexually active without any concerns and pt declines STD testing  Denies F/C/N/V  No abn paps in the past   +STD hx in the past      Health Maintenance:  Last PAP:  5/26/2020, negative  Next PAP Due: 5 years      Past Medical History:   Diagnosis Date    Allergic rhinitis     Costochondritis 5/30/2018    Dyspnea on exertion 5/30/2018    Obesity     Oral contraceptive use     Plantar wart of right foot 5/29/2019     Past Surgical History:   Procedure Laterality Date    WISDOM TOOTH EXTRACTION       Family History   Problem Relation Age of Onset    Hypertension Mother     Diabetes Father     Ovarian cancer Neg Hx     Breast cancer Neg Hx     Uterine cancer Neg Hx     Cervical cancer Neg Hx      Social History     Tobacco Use    Smoking status: Never Smoker    Smokeless tobacco: Never Used   Vaping Use    Vaping Use: Never used   Substance Use Topics    Alcohol use:  Yes Comment: Social    Drug use: No       Current Outpatient Medications:     Patricia 24 Fe 1-20 MG-MCG(24) CHEW, CHEW 1 TABLET BY MOUTH EVERY DAY, Disp: 28 tablet, Rfl: 1    loratadine (CLARITIN) 10 mg tablet, Take 10 mg by mouth daily, Disp: , Rfl:   Patient Active Problem List    Diagnosis Date Noted    Encounter for annual routine gynecological examination 2021    Injection site irritation, initial encounter 2021    Reactive depression 2020    BMI 45 0-49 9, adult ([facility] [address] ) 2019    Vitamin D deficiency 2019    Foot arch pain 2019    Pes planus of left foot 2019    Abnormal menses 2018    Allergic rhinitis 2018    Acute frontal sinusitis 2017       Allergies   Allergen Reactions    Pollen Extract Allergic Rhinitis       OB History    Para Term  AB Living   0 0 0 0 0 0   SAB TAB Ectopic Multiple Live Births   0 0 0 0 0       Vitals:    21 1410   BP: 122/82   BP Location: Right arm   Patient Position: Sitting   Weight: (!) 137 kg (301 lb)   Height: 5' 8" (1 727 m)     Body mass index is 45 77 kg/m²  Review of Systems   Constitutional: Negative for chills, fatigue, fever and unexpected weight change  Respiratory: Negative for shortness of breath  Gastrointestinal: Negative for anal bleeding, blood in stool, constipation and diarrhea  Genitourinary: Negative for difficulty urinating, dysuria and hematuria  OBGyn Exam    Physical Exam   Constitutional: She appears well-developed and well-nourished  No distress  Head: Normocephalic  Neck: Normal range of motion  Neck supple  Pulmonary: Effort normal   Breasts: bilateral without masses, skin changes or nipple discharge  Bilaterally soft and warm to touch  No areas of erythema or pain  Abdominal: Soft  Non-tender, obese  Pelvic exam was performed with patient supine  No labial fusion  There is no rash, tenderness, lesion or injury on the right labia   There is no rash, tenderness, lesion or injury on the left labia  Uterus is not deviated, not enlarged, not fixed and not tender  Cervix exhibits no motion tenderness, no discharge and no friability  Right adnexum displays no mass, no tenderness and no fullness  Left adnexum displays no mass, no tenderness and no fullness  No erythema or tenderness in the vagina  No foreign body in the vagina  No signs of injury around the vagina  Scant, white vaginal discharge found  Lymphadenopathy:        Right: No inguinal adenopathy present          Left: No inguinal adenopathy present

## 2021-06-17 NOTE — PATIENT INSTRUCTIONS
Mammogram   GENERAL INFORMATION:   What is a mammogram?  A mammogram is an x-ray of your breasts to screen for breast cancer  Who should have a mammogram?  You should have a mammogram if you felt a lump or noticed other changes during a breast self-exam  Talk to your caregiver about when you should start having mammograms  How do I get ready for a mammogram?   · Do not use deodorant, powder, lotion, or perfume  These products may cause particles to appear on your mammogram      · Wear a 2-piece outfit  · If you are breastfeeding, express as much milk as possible before the mammogram     · Bring a list of the dates and places of your past mammograms and other breast tests or treatments  · If your breasts are tender before your monthly period, do not have a mammogram during this time  Schedule your mammogram to be done 1 week after your period ends  How is a mammogram done? A top view and a side view x-ray are usually done for each breast  Tell caregivers if you have breast implants or breast problems before you have your mammogram  You may need extra x-rays of each breast   · You will be given a hospital gown  Take off your clothes from the waist up  Wear the hospital gown so that it opens in the front  · You will sit or stand next to a small x-ray machine  The caregiver will help you place one of your breasts on the x-ray plate  Your arm and breast will be moved until your breast is in the correct position  · Your breast will be gently pressed between 2 plastic plates for a few seconds while the x-ray is taken  This may be uncomfortable  · You will be asked to hold your breath while the x-ray is taken  Another x-ray will be taken of the same breast after the position of the x-ray machine has been changed  · Your other breast will be x-rayed the same way    What happens after my mammogram?  Your breasts may feel tender for a short while after the mammogram  You may resume your regular activities  Ask your caregiver when you should receive the results of your mammogram   What are the risks of a mammogram?  You will be exposed to a small amount of radiation  Some breast cancers may not show up on mammograms  When should I contact my caregiver? Contact your caregiver if:  · You cannot make your appointment on time  · You do not receive your results when expected  · You have questions or concerns about the mammogram   CARE AGREEMENT:   You have the right to help plan your care  Learn about your health condition and how it may be treated  Discuss treatment options with your caregivers to decide what care you want to receive  You always have the right to refuse treatment  The above information is an  only  It is not intended as medical advice for individual conditions or treatments  Talk to your doctor, nurse or pharmacist before following any medical regimen to see if it is safe and effective for you  © 2014 [address] is for End User's use only and may not be sold, redistributed or otherwise used for commercial purposes  All illustrations and images included in CareNotes® are the copyrighted property of A D A M , Inc  or [employer]rold[employer] Dominguez

## 2021-09-16 ENCOUNTER — OFFICE VISIT (OUTPATIENT)
Dept: FAMILY MEDICINE CLINIC | Facility: CLINIC | Age: 31
End: 2021-09-16
Payer: COMMERCIAL

## 2021-09-16 VITALS
SYSTOLIC BLOOD PRESSURE: 122 MMHG | OXYGEN SATURATION: 97 % | HEART RATE: 90 BPM | WEIGHT: 293 LBS | DIASTOLIC BLOOD PRESSURE: 76 MMHG | BODY MASS INDEX: 44.41 KG/M2 | RESPIRATION RATE: 18 BRPM | HEIGHT: 68 IN | TEMPERATURE: 98.6 F

## 2021-09-16 DIAGNOSIS — Z13.29 SCREENING FOR THYROID DISORDER: ICD-10-CM

## 2021-09-16 DIAGNOSIS — F51.01 PRIMARY INSOMNIA: Primary | ICD-10-CM

## 2021-09-16 DIAGNOSIS — F33.9 DEPRESSION, RECURRENT (HCC): ICD-10-CM

## 2021-09-16 DIAGNOSIS — Z13.1 SCREENING FOR DIABETES MELLITUS: ICD-10-CM

## 2021-09-16 DIAGNOSIS — Z13.0 SCREENING FOR DEFICIENCY ANEMIA: ICD-10-CM

## 2021-09-16 DIAGNOSIS — Z13.220 SCREENING FOR HYPERLIPIDEMIA: ICD-10-CM

## 2021-09-16 PROCEDURE — 3008F BODY MASS INDEX DOCD: CPT | Performed by: NURSE PRACTITIONER

## 2021-09-16 PROCEDURE — 1036F TOBACCO NON-USER: CPT | Performed by: NURSE PRACTITIONER

## 2021-09-16 PROCEDURE — 99213 OFFICE O/P EST LOW 20 MIN: CPT | Performed by: NURSE PRACTITIONER

## 2021-09-16 RX ORDER — BUPROPION HYDROCHLORIDE 150 MG/1
150 TABLET ORAL DAILY
COMMUNITY
End: 2021-12-17 | Stop reason: SDUPTHER

## 2021-09-16 RX ORDER — TRAZODONE HYDROCHLORIDE 50 MG/1
50 TABLET ORAL
Qty: 30 TABLET | Refills: 0 | Status: SHIPPED | OUTPATIENT
Start: 2021-09-16 | End: 2021-10-11

## 2021-09-16 NOTE — PATIENT INSTRUCTIONS
Calm is an jose - this has soothing background sounds like ocean sounds and can talk you through meditation to help you sleep  This jose also has bedtime stories  [employer] is an jose to help with meditation and relaxation       "sleep with me" is a podcast you can try -a nice boring voice telling you a boring story to help you sleep  Sleep Hygiene Tips  The most common cause of insomnia is a change in your daily routine  For example,  traveling, change in work hours, disruption of other behaviors (eating, exercise, leisure,  etc ), and relationship conflicts can all cause sleep problems  Paying attention to good  sleep hygiene is the most important thing you can do to maintain good sleep  Do:  1  Go to bed at the same time each day  2  Get up from bed at the same time each day  3  Get regular exercise each day, preferably in the morning  There is good evidence that  regular exercise improves restful sleep  This includes stretching and aerobic exercise  4  Get regular exposure to outdoor or bright lights, especially in the late afternoon  5  Keep the temperature in your bedroom comfortable  6  Keep the bedroom quiet when sleeping  7  Keep the bedroom dark enough to facilitate sleep  8  Use your bed only for sleep and sex  9  Take medications as directed  It is helpful to take prescribed sleeping pills 1 hour  before bedtime, so they are causing drowsiness when you lie down, or 10 hours  before getting up, to avoid daytime drowsiness  10  Use a relaxation exercise just before going to sleep   o Muscle relaxation, imagery, massage, warm bath, etc   11  Keep your feet and hands warm  Wear warm socks and/or mittens or gloves to bed  Don't:  1  Exercise just before going to bed    2  Engage in stimulating activity just before bed, such as playing a competitive game,  watching an exciting program on television or movie, or having an important  discussion with a loved one   3  Have caffeine in the evening (coffee, many teas, chocolate, sodas, etc )   4  Read or watch television in bed  5  Use alcohol to help you sleep  6  Go to bed too hungry or too full  7  Take another person's sleeping pills  8  Take over-the-counter sleeping pills, without your doctor's knowledge  Tolerance can  develop rapidly with these medications  Diphenhydramine (an ingredient commonly  found in over-the-counter sleep meds) can have serious side effects for elderly patients  9  Take daytime naps  10  Command yourself to go to sleep  This only makes your mind and body more alert  If you lie in bed awake for more than 20-30 minutes, get up, go to a different room (or different part of the bedroom), participate in a quiet activity (e g  non-excitable reading or television),and then return to bed when you feel sleepy  Do this as many times during the night as needed

## 2021-09-16 NOTE — PROGRESS NOTES
Assessment/Plan:    Problem List Items Addressed This Visit     BMI 45 0-49 9, adult (HCC)    Depression, recurrent (HCC)    Relevant Medications    buPROPion (WELLBUTRIN XL) 150 mg 24 hr tablet    traZODone (DESYREL) 50 mg tablet      Other Visit Diagnoses     Primary insomnia    -  Primary    Relevant Medications    traZODone (DESYREL) 50 mg tablet    Screening for thyroid disorder        Relevant Orders    TSH, 3rd generation with Free T4 reflex    Screening for hyperlipidemia        Relevant Orders    Lipid Panel with Direct LDL reflex    Screening for deficiency anemia        Relevant Orders    CBC and differential    Screening for diabetes mellitus        Relevant Orders    Comprehensive metabolic panel           Diagnoses and all orders for this visit:    Primary insomnia  -     traZODone (DESYREL) 50 mg tablet; Take 1 tablet (50 mg total) by mouth daily at bedtime as needed for sleep    Depression, recurrent (HCC)    BMI 45 0-49 9, adult ([facility] [address] )    Screening for thyroid disorder  -     TSH, 3rd generation with Free T4 reflex; Future    Screening for hyperlipidemia  -     Lipid Panel with Direct LDL reflex; Future    Screening for deficiency anemia  -     CBC and differential; Future    Screening for diabetes mellitus  -     Comprehensive metabolic panel; Future    Other orders  -     buPROPion (WELLBUTRIN XL) 150 mg 24 hr tablet; Take 150 mg by mouth daily        No problem-specific Assessment & Plan notes found for this encounter  Subjective:      Patient ID: Nhung Guy is a 28 y o  female  Nhung Guy presents with insomnia  Onset several months ago  Home Tx Advil PM  She reports that his is becoming ineffective and will require 3 at a time to get to sleep  She also tried and OTC product that has melatonin in and states that it made her feel groggy the following AM      Depression  This is a chronic problem  The symptoms are aggravated by stress (lack of sleep)   The treatment provided moderate relief  The following portions of the patient's history were reviewed and updated as appropriate:   She has a past medical history of Allergic rhinitis, Costochondritis (5/30/2018), Dyspnea on exertion (5/30/2018), Obesity, Oral contraceptive use, and Plantar wart of right foot (5/29/2019)  ,  does not have any pertinent problems on file  ,   has a past surgical history that includes [place] tooth extraction  ,  family history includes Diabetes in her father; Hypertension in her mother  ,   reports that she has never smoked  She has never used smokeless tobacco  She reports current alcohol use  She reports that she does not use drugs  ,  is allergic to pollen extract     Current Outpatient Medications   Medication Sig Dispense Refill    buPROPion (WELLBUTRIN XL) 150 mg 24 hr tablet Take 150 mg by mouth daily      drospirenone-ethinyl estradiol (JASON) 3-0 03 MG per tablet Take 1 tablet by mouth daily 90 tablet 3    loratadine (CLARITIN) 10 mg tablet Take 10 mg by mouth daily      traZODone (DESYREL) 50 mg tablet Take 1 tablet (50 mg total) by mouth daily at bedtime as needed for sleep 30 tablet 0     No current facility-administered medications for this visit  Review of Systems   Skin: Negative  Psychiatric/Behavioral: Positive for depression and sleep disturbance  All other systems reviewed and are negative  Objective:  Vitals:    09/16/21 0706   BP: 122/76   Pulse: 90   Resp: 18   Temp: 98 6 °F (37 °C)   SpO2: 97%   Weight: (!) 137 kg (301 lb)   Height: 5' 8" (1 727 m)     Body mass index is 45 77 kg/m²  Physical Exam  Vitals and nursing note reviewed  Constitutional:       Appearance: Normal appearance  She is well-developed  She is obese  HENT:      Head: Normocephalic and atraumatic        Right Ear: Tympanic membrane, ear canal and external ear normal       Left Ear: Tympanic membrane, ear canal and external ear normal       Nose: Nose normal       Mouth/Throat: Mouth: Mucous membranes are moist       Pharynx: Uvula midline  Eyes:      General: Lids are normal       Conjunctiva/sclera: Conjunctivae normal       Pupils: Pupils are equal, round, and reactive to light  Neck:      Thyroid: No thyroid mass or thyromegaly  Vascular: No JVD  Trachea: Trachea and phonation normal    Cardiovascular:      Rate and Rhythm: Normal rate and regular rhythm  Pulses: Normal pulses  Heart sounds: Normal heart sounds, S1 normal and S2 normal  No murmur heard  No friction rub  No gallop  Pulmonary:      Effort: Pulmonary effort is normal       Breath sounds: Normal breath sounds  Abdominal:      General: Bowel sounds are normal       Palpations: Abdomen is soft  Tenderness: There is no abdominal tenderness  Genitourinary:     Comments: Deferred   Musculoskeletal:         General: Normal range of motion  Cervical back: Full passive range of motion without pain, normal range of motion and neck supple  Right lower leg: No edema  Left lower leg: No edema  Lymphadenopathy:      Head:      Right side of head: No submental, submandibular, tonsillar, preauricular, posterior auricular or occipital adenopathy  Left side of head: No submental, submandibular, tonsillar, preauricular, posterior auricular or occipital adenopathy  Cervical: No cervical adenopathy  Skin:     General: Skin is warm and dry  Capillary Refill: Capillary refill takes less than 2 seconds  Neurological:      General: No focal deficit present  Mental Status: She is alert and oriented to person, place, and time  Cranial Nerves: Cranial nerves are intact  No cranial nerve deficit  Sensory: Sensation is intact  Motor: Motor function is intact  Coordination: Coordination is intact  Gait: Gait is intact  Deep Tendon Reflexes: Reflexes are normal and symmetric     Psychiatric:         Attention and Perception: Attention and perception normal          Mood and Affect: Mood and affect normal          Speech: Speech normal          Behavior: Behavior normal  Behavior is cooperative  Thought Content:  Thought content normal          Cognition and Memory: Cognition normal          Judgment: Judgment normal

## 2021-10-08 DIAGNOSIS — F51.01 PRIMARY INSOMNIA: ICD-10-CM

## 2021-10-11 RX ORDER — TRAZODONE HYDROCHLORIDE 50 MG/1
50 TABLET ORAL
Qty: 30 TABLET | Refills: 0 | Status: SHIPPED | OUTPATIENT
Start: 2021-10-11 | End: 2021-11-08

## 2021-10-13 ENCOUNTER — IMMUNIZATIONS (OUTPATIENT)
Dept: FAMILY MEDICINE CLINIC | Facility: CLINIC | Age: 31
End: 2021-10-13
Payer: COMMERCIAL

## 2021-10-13 DIAGNOSIS — Z23 ENCOUNTER FOR IMMUNIZATION: Primary | ICD-10-CM

## 2021-10-13 PROCEDURE — 90471 IMMUNIZATION ADMIN: CPT

## 2021-10-13 PROCEDURE — 90686 IIV4 VACC NO PRSV 0.5 ML IM: CPT

## 2021-11-07 DIAGNOSIS — F51.01 PRIMARY INSOMNIA: ICD-10-CM

## 2021-11-08 RX ORDER — TRAZODONE HYDROCHLORIDE 50 MG/1
50 TABLET ORAL
Qty: 30 TABLET | Refills: 0 | Status: SHIPPED | OUTPATIENT
Start: 2021-11-08 | End: 2021-12-01

## 2021-12-01 DIAGNOSIS — F51.01 PRIMARY INSOMNIA: ICD-10-CM

## 2021-12-01 RX ORDER — TRAZODONE HYDROCHLORIDE 50 MG/1
50 TABLET ORAL
Qty: 30 TABLET | Refills: 0 | Status: SHIPPED | OUTPATIENT
Start: 2021-12-01 | End: 2022-01-03

## 2021-12-16 ENCOUNTER — TELEPHONE (OUTPATIENT)
Dept: FAMILY MEDICINE CLINIC | Facility: CLINIC | Age: 31
End: 2021-12-16

## 2021-12-16 DIAGNOSIS — F33.9 DEPRESSION, RECURRENT (HCC): Primary | ICD-10-CM

## 2021-12-17 RX ORDER — BUPROPION HYDROCHLORIDE 300 MG/1
300 TABLET ORAL DAILY
Qty: 90 TABLET | Refills: 1 | Status: SHIPPED | OUTPATIENT
Start: 2021-12-17 | End: 2022-06-12

## 2021-12-29 DIAGNOSIS — F51.01 PRIMARY INSOMNIA: ICD-10-CM

## 2022-01-03 ENCOUNTER — OFFICE VISIT (OUTPATIENT)
Dept: FAMILY MEDICINE CLINIC | Facility: CLINIC | Age: 32
End: 2022-01-03
Payer: COMMERCIAL

## 2022-01-03 VITALS
SYSTOLIC BLOOD PRESSURE: 126 MMHG | HEART RATE: 130 BPM | OXYGEN SATURATION: 97 % | BODY MASS INDEX: 44.41 KG/M2 | WEIGHT: 293 LBS | TEMPERATURE: 98.6 F | HEIGHT: 68 IN | DIASTOLIC BLOOD PRESSURE: 68 MMHG

## 2022-01-03 DIAGNOSIS — B34.9 VIRAL INFECTION, UNSPECIFIED: Primary | ICD-10-CM

## 2022-01-03 DIAGNOSIS — Z20.828 EXPOSURE TO THE FLU: ICD-10-CM

## 2022-01-03 PROCEDURE — 3008F BODY MASS INDEX DOCD: CPT | Performed by: NURSE PRACTITIONER

## 2022-01-03 PROCEDURE — 1036F TOBACCO NON-USER: CPT | Performed by: NURSE PRACTITIONER

## 2022-01-03 PROCEDURE — 87636 SARSCOV2 & INF A&B AMP PRB: CPT | Performed by: NURSE PRACTITIONER

## 2022-01-03 PROCEDURE — 99213 OFFICE O/P EST LOW 20 MIN: CPT | Performed by: NURSE PRACTITIONER

## 2022-01-03 RX ORDER — TRAZODONE HYDROCHLORIDE 50 MG/1
50 TABLET ORAL
Qty: 30 TABLET | Refills: 0 | Status: SHIPPED | OUTPATIENT
Start: 2022-01-03 | End: 2022-01-31

## 2022-01-03 NOTE — LETTER
January 3, 2022     Patient: Curt Cockayne   YOB: 1990   Date of Visit: 1/3/2022       To Whom it May Concern:    Pascual Enriquez is under my professional care  She was seen in my office on 1/3/2022  She may return to work on 1/8/2022  If you have any questions or concerns, please don't hesitate to call           Sincerely,          RHIANNA Medrano        CC: No Recipients

## 2022-01-03 NOTE — PROGRESS NOTES
Assessment/Plan:    Problem List Items Addressed This Visit     None      Visit Diagnoses     Viral infection, unspecified    -  Primary    hydrate, OTC medications specific to symptoms    Relevant Orders    COVID/FLU-Office Collect    Exposure to the flu               Diagnoses and all orders for this visit:    Viral infection, unspecified  Comments:  hydrate, OTC medications specific to symptoms  Orders:  -     COVID/FLU-Office Collect    Exposure to the flu        No problem-specific Assessment & Plan notes found for this encounter  Subjective:      Patient ID: Greg Ely is a 28 y o  female  Past patient presents [facility] for upper respiratory symptoms  Onset of symptoms last night which include sore throat, back pain and fever  T-max 102 4° at home  Patient has been treating herself Tylenol which is effective in reducing her fever  Patient states that she was exposed to the flu at work  Patient did receive 3 COVID vaccines  The following portions of the patient's history were reviewed and updated as appropriate:   She has a past medical history of Allergic rhinitis, Costochondritis (5/30/2018), Dyspnea on exertion (5/30/2018), Obesity, Oral contraceptive use, and Plantar wart of right foot (5/29/2019)  ,  does not have any pertinent problems on file  ,   has a past surgical history that includes [place] tooth extraction  ,  family history includes Diabetes in her father; Hypertension in her mother  ,   reports that she has never smoked  She has never used smokeless tobacco  She reports current alcohol use  She reports that she does not use drugs  ,  is allergic to pollen extract     Current Outpatient Medications   Medication Sig Dispense Refill    buPROPion (WELLBUTRIN XL) 300 mg 24 hr tablet Take 1 tablet (300 mg total) by mouth daily 90 tablet 1    drospirenone-ethinyl estradiol (JASON) 3-0 03 MG per tablet Take 1 tablet by mouth daily 90 tablet 3    loratadine (CLARITIN) 10 mg tablet Take 10 mg by mouth daily      traZODone (DESYREL) 50 mg tablet TAKE 1 TABLET (50 MG TOTAL) BY MOUTH DAILY AT BEDTIME AS NEEDED FOR SLEEP 30 tablet 0     No current facility-administered medications for this visit  Review of Systems   HENT: Positive for sore throat  Musculoskeletal: Positive for back pain and myalgias  All other systems reviewed and are negative  Objective:  Vitals:    01/03/22 1442   BP: 126/68   BP Location: Left arm   Patient Position: Sitting   Cuff Size: Standard   Pulse: (!) 130   Temp: 98 6 °F (37 °C)   TempSrc: Tympanic   SpO2: 97%   Weight: (!) 137 kg (301 lb)   Height: 5' 8" (1 727 m)     Body mass index is 45 77 kg/m²  Physical Exam  Vitals and nursing note reviewed  Constitutional:       Appearance: Normal appearance  HENT:      Head: Normocephalic and atraumatic  Nose: Nose normal    Cardiovascular:      Rate and Rhythm: Normal rate  Pulses: Normal pulses  Pulmonary:      Effort: Pulmonary effort is normal    Skin:     General: Skin is warm and dry  Capillary Refill: Capillary refill takes less than 2 seconds  Neurological:      General: No focal deficit present  Mental Status: She is alert and oriented to person, place, and time     Psychiatric:         Mood and Affect: Mood normal          Behavior: Behavior normal

## 2022-01-06 ENCOUNTER — TELEPHONE (OUTPATIENT)
Dept: FAMILY MEDICINE CLINIC | Facility: CLINIC | Age: 32
End: 2022-01-06

## 2022-01-06 NOTE — TELEPHONE ENCOUNTER
Pt reports OTC meds are making her vomit  Is there anything other than Cleine-seltzer cold and flu you can recommend

## 2022-01-07 LAB
FLUAV RNA RESP QL NAA+PROBE: NEGATIVE
FLUBV RNA RESP QL NAA+PROBE: NEGATIVE
SARS-COV-2 RNA RESP QL NAA+PROBE: NEGATIVE

## 2022-01-29 DIAGNOSIS — F51.01 PRIMARY INSOMNIA: ICD-10-CM

## 2022-01-31 RX ORDER — TRAZODONE HYDROCHLORIDE 50 MG/1
50 TABLET ORAL
Qty: 30 TABLET | Refills: 0 | Status: SHIPPED | OUTPATIENT
Start: 2022-01-31 | End: 2022-03-02

## 2022-02-28 DIAGNOSIS — F51.01 PRIMARY INSOMNIA: ICD-10-CM

## 2022-03-02 RX ORDER — TRAZODONE HYDROCHLORIDE 50 MG/1
50 TABLET ORAL
Qty: 30 TABLET | Refills: 0 | Status: SHIPPED | OUTPATIENT
Start: 2022-03-02 | End: 2022-03-31

## 2022-04-11 ENCOUNTER — TELEPHONE (OUTPATIENT)
Dept: OBGYN CLINIC | Facility: CLINIC | Age: 32
End: 2022-04-11

## 2022-04-11 DIAGNOSIS — F51.01 PRIMARY INSOMNIA: ICD-10-CM

## 2022-04-11 RX ORDER — TRAZODONE HYDROCHLORIDE 50 MG/1
TABLET ORAL
Qty: 90 TABLET | Refills: 1 | Status: SHIPPED | OUTPATIENT
Start: 2022-04-11

## 2022-04-11 NOTE — TELEPHONE ENCOUNTER
Pt stated that for her birth control, it is considered a tier 3 Rx , not allowed to get a 3 month supply anymore , and will be charging around 50 dollars a month for it       Please advise

## 2022-04-12 DIAGNOSIS — Z30.41 ENCOUNTER FOR SURVEILLANCE OF CONTRACEPTIVE PILLS: ICD-10-CM

## 2022-04-12 RX ORDER — DROSPIRENONE AND ETHINYL ESTRADIOL 0.03MG-3MG
1 KIT ORAL DAILY
Qty: 90 TABLET | Refills: 0 | Status: SHIPPED | OUTPATIENT
Start: 2022-04-12 | End: 2022-07-05

## 2022-06-01 ENCOUNTER — APPOINTMENT (OUTPATIENT)
Dept: LAB | Facility: CLINIC | Age: 32
End: 2022-06-01
Payer: COMMERCIAL

## 2022-06-01 DIAGNOSIS — R39.9 UTI SYMPTOMS: Primary | ICD-10-CM

## 2022-06-01 PROCEDURE — 87186 SC STD MICRODIL/AGAR DIL: CPT

## 2022-06-01 PROCEDURE — 87086 URINE CULTURE/COLONY COUNT: CPT

## 2022-06-01 PROCEDURE — 87077 CULTURE AEROBIC IDENTIFY: CPT

## 2022-06-02 ENCOUNTER — TELEPHONE (OUTPATIENT)
Dept: FAMILY MEDICINE CLINIC | Facility: CLINIC | Age: 32
End: 2022-06-02

## 2022-06-04 LAB
BACTERIA UR CULT: ABNORMAL
BACTERIA UR CULT: ABNORMAL

## 2022-06-06 DIAGNOSIS — N30.00 ACUTE CYSTITIS WITHOUT HEMATURIA: Primary | ICD-10-CM

## 2022-06-06 RX ORDER — NITROFURANTOIN 25; 75 MG/1; MG/1
100 CAPSULE ORAL 2 TIMES DAILY
Qty: 10 CAPSULE | Refills: 0 | Status: SHIPPED | OUTPATIENT
Start: 2022-06-06 | End: 2022-06-15 | Stop reason: ALTCHOICE

## 2022-06-08 ENCOUNTER — APPOINTMENT (OUTPATIENT)
Dept: LAB | Facility: CLINIC | Age: 32
End: 2022-06-08
Payer: COMMERCIAL

## 2022-06-08 DIAGNOSIS — Z13.29 SCREENING FOR THYROID DISORDER: ICD-10-CM

## 2022-06-08 DIAGNOSIS — Z13.0 SCREENING FOR DEFICIENCY ANEMIA: ICD-10-CM

## 2022-06-08 DIAGNOSIS — Z13.220 SCREENING FOR HYPERLIPIDEMIA: ICD-10-CM

## 2022-06-08 DIAGNOSIS — Z13.1 SCREENING FOR DIABETES MELLITUS: ICD-10-CM

## 2022-06-08 LAB
ALBUMIN SERPL BCP-MCNC: 3.7 G/DL (ref 3.5–5)
ALP SERPL-CCNC: 48 U/L (ref 46–116)
ALT SERPL W P-5'-P-CCNC: 29 U/L (ref 12–78)
ANION GAP SERPL CALCULATED.3IONS-SCNC: 11 MMOL/L (ref 4–13)
AST SERPL W P-5'-P-CCNC: 18 U/L (ref 5–45)
BASOPHILS # BLD AUTO: 0.06 THOUSANDS/ΜL (ref 0–0.1)
BASOPHILS NFR BLD AUTO: 1 % (ref 0–1)
BILIRUB SERPL-MCNC: 0.53 MG/DL (ref 0.2–1)
BUN SERPL-MCNC: 10 MG/DL (ref 5–25)
CALCIUM SERPL-MCNC: 9.6 MG/DL (ref 8.3–10.1)
CHLORIDE SERPL-SCNC: 105 MMOL/L (ref 100–108)
CHOLEST SERPL-MCNC: 213 MG/DL
CO2 SERPL-SCNC: 23 MMOL/L (ref 21–32)
CREAT SERPL-MCNC: 0.97 MG/DL (ref 0.6–1.3)
EOSINOPHIL # BLD AUTO: 0.09 THOUSAND/ΜL (ref 0–0.61)
EOSINOPHIL NFR BLD AUTO: 1 % (ref 0–6)
ERYTHROCYTE [DISTWIDTH] IN BLOOD BY AUTOMATED COUNT: 13.3 % (ref 11.6–15.1)
GFR SERPL CREATININE-BSD FRML MDRD: 78 ML/MIN/1.73SQ M
GLUCOSE P FAST SERPL-MCNC: 96 MG/DL (ref 65–99)
HCT VFR BLD AUTO: 46 % (ref 34.8–46.1)
HDLC SERPL-MCNC: 46 MG/DL
HGB BLD-MCNC: 14.1 G/DL (ref 11.5–15.4)
IMM GRANULOCYTES # BLD AUTO: 0.04 THOUSAND/UL (ref 0–0.2)
IMM GRANULOCYTES NFR BLD AUTO: 1 % (ref 0–2)
LDLC SERPL CALC-MCNC: 88 MG/DL (ref 0–100)
LYMPHOCYTES # BLD AUTO: 1.9 THOUSANDS/ΜL (ref 0.6–4.47)
LYMPHOCYTES NFR BLD AUTO: 28 % (ref 14–44)
MCH RBC QN AUTO: 24.7 PG (ref 26.8–34.3)
MCHC RBC AUTO-ENTMCNC: 30.7 G/DL (ref 31.4–37.4)
MCV RBC AUTO: 81 FL (ref 82–98)
MONOCYTES # BLD AUTO: 0.52 THOUSAND/ΜL (ref 0.17–1.22)
MONOCYTES NFR BLD AUTO: 8 % (ref 4–12)
NEUTROPHILS # BLD AUTO: 4.17 THOUSANDS/ΜL (ref 1.85–7.62)
NEUTS SEG NFR BLD AUTO: 61 % (ref 43–75)
NRBC BLD AUTO-RTO: 0 /100 WBCS
PLATELET # BLD AUTO: 298 THOUSANDS/UL (ref 149–390)
PMV BLD AUTO: 10.3 FL (ref 8.9–12.7)
POTASSIUM SERPL-SCNC: 4.1 MMOL/L (ref 3.5–5.3)
PROT SERPL-MCNC: 7.6 G/DL (ref 6.4–8.2)
RBC # BLD AUTO: 5.7 MILLION/UL (ref 3.81–5.12)
SODIUM SERPL-SCNC: 139 MMOL/L (ref 136–145)
TRIGL SERPL-MCNC: 393 MG/DL
TSH SERPL DL<=0.05 MIU/L-ACNC: 1.4 UIU/ML (ref 0.45–4.5)
WBC # BLD AUTO: 6.78 THOUSAND/UL (ref 4.31–10.16)

## 2022-06-08 PROCEDURE — 36415 COLL VENOUS BLD VENIPUNCTURE: CPT

## 2022-06-08 PROCEDURE — 80061 LIPID PANEL: CPT

## 2022-06-08 PROCEDURE — 84443 ASSAY THYROID STIM HORMONE: CPT

## 2022-06-08 PROCEDURE — 85025 COMPLETE CBC W/AUTO DIFF WBC: CPT

## 2022-06-08 PROCEDURE — 80053 COMPREHEN METABOLIC PANEL: CPT

## 2022-06-12 DIAGNOSIS — F33.9 DEPRESSION, RECURRENT (HCC): ICD-10-CM

## 2022-06-12 RX ORDER — BUPROPION HYDROCHLORIDE 300 MG/1
TABLET ORAL
Qty: 90 TABLET | Refills: 1 | Status: SHIPPED | OUTPATIENT
Start: 2022-06-12

## 2022-06-15 ENCOUNTER — OFFICE VISIT (OUTPATIENT)
Dept: FAMILY MEDICINE CLINIC | Facility: CLINIC | Age: 32
End: 2022-06-15
Payer: COMMERCIAL

## 2022-06-15 VITALS
OXYGEN SATURATION: 98 % | HEIGHT: 68 IN | WEIGHT: 293 LBS | TEMPERATURE: 98.7 F | BODY MASS INDEX: 44.41 KG/M2 | SYSTOLIC BLOOD PRESSURE: 118 MMHG | HEART RATE: 93 BPM | DIASTOLIC BLOOD PRESSURE: 78 MMHG

## 2022-06-15 DIAGNOSIS — Z00.00 ANNUAL PHYSICAL EXAM: Primary | ICD-10-CM

## 2022-06-15 DIAGNOSIS — Z11.59 NEED FOR HEPATITIS C SCREENING TEST: ICD-10-CM

## 2022-06-15 DIAGNOSIS — F33.9 DEPRESSION, RECURRENT (HCC): ICD-10-CM

## 2022-06-15 DIAGNOSIS — E78.1 HYPERTRIGLYCERIDEMIA: ICD-10-CM

## 2022-06-15 PROCEDURE — 3725F SCREEN DEPRESSION PERFORMED: CPT | Performed by: NURSE PRACTITIONER

## 2022-06-15 PROCEDURE — 3008F BODY MASS INDEX DOCD: CPT | Performed by: NURSE PRACTITIONER

## 2022-06-15 PROCEDURE — 1036F TOBACCO NON-USER: CPT | Performed by: NURSE PRACTITIONER

## 2022-06-15 PROCEDURE — 99395 PREV VISIT EST AGE 18-39: CPT | Performed by: NURSE PRACTITIONER

## 2022-06-15 RX ORDER — OMEGA-3S/DHA/EPA/FISH OIL/D3 300MG-1000
2 CAPSULE ORAL DAILY
Qty: 180 CAPSULE | Refills: 1 | Status: SHIPPED | OUTPATIENT
Start: 2022-06-15

## 2022-06-15 NOTE — PROGRESS NOTES
[facility] CARE    NAME: Abundio Bearden  AGE: 33 y o  SEX: female  : 1990     DATE: 6/15/2022     Assessment and Plan:     Problem List Items Addressed This Visit     BMI 45 0-49 9, adult ([facility] [address] )    Depression, recurrent ([facility] )      Other Visit Diagnoses     Annual physical exam    -  Primary    Need for hepatitis C screening test        Relevant Orders    Hepatitis C Antibody ([facility])    Hypertriglyceridemia        Relevant Medications    Omega-3 Fatty Acids (Fish Oil Burp-Less) 1000 MG CAPS    Other Relevant Orders    Lipid Panel with Direct LDL reflex          Immunizations and preventive care screenings were discussed with patient today  Appropriate education was printed on patient's after visit summary  Counseling:  Alcohol/drug use: discussed moderation in alcohol intake, the recommendations for healthy alcohol use, and avoidance of illicit drug use  Dental Health: discussed importance of regular tooth brushing, flossing, and dental visits  Injury prevention: discussed safety/seat belts, safety helmets, smoke detectors, carbon dioxide detectors, and smoking near bedding or upholstery  Sexual health: discussed sexually transmitted diseases, partner selection, use of condoms, avoidance of unintended pregnancy, and contraceptive alternatives  Exercise: the importance of regular exercise/physical activity was discussed  Recommend exercise 3-5 times per week for at least 30 minutes  BMI Counseling: Body mass index is 45 31 kg/m²  The BMI is above normal  Nutrition recommendations include decreasing portion sizes, consuming healthier snacks, limiting drinks that contain sugar, moderation in carbohydrate intake, increasing intake of lean protein, reducing intake of saturated and trans fat and reducing intake of cholesterol  Exercise recommendations include exercising 3-5 times per week   No pharmacotherapy was ordered  Rationale for BMI follow-up plan is due to patient being overweight or obese  Return in 6 months (on 12/15/2022)  Chief Complaint:     Chief Complaint   Patient presents with    Annual Exam      History of Present Illness:     Adult Annual Physical   Patient here for a comprehensive physical exam  The patient reports no problems  Diet and Physical Activity  Diet/Nutrition: poor diet, frequent junk food, high fat diet and limited fruits/vegetables  Exercise: walking and 3-4 times a week on average  Depression Screening  PHQ-2/9 Depression Screening    Little interest or pleasure in doing things: 1 - several days  Feeling down, depressed, or hopeless: 2 - more than half the days  Trouble falling or staying asleep, or sleeping too much: 2 - more than half the days  Feeling tired or having little energy: 1 - several days  Poor appetite or overeatin - more than half the days  Feeling bad about yourself - or that you are a failure or have let yourself or your family down: 1 - several days  Trouble concentrating on things, such as reading the newspaper or watching television: 1 - several days  Moving or speaking so slowly that other people could have noticed  Or the opposite - being so fidgety or restless that you have been moving around a lot more than usual: 0 - not at all  Thoughts that you would be better off dead, or of hurting yourself in some way: 0 - not at all  PHQ-9 Score: 10   PHQ-9 Interpretation: Moderate depression      Depression  This is a chronic problem  The problem occurs daily  She has tried relaxation (trazodone, bupropion) for the symptoms  The treatment provided moderate relief  General Health  Sleep: sleeps poorly, gets 4-6 hours of sleep on average and unrefreshing sleep  Hearing: normal - bilateral   Vision: vision problems: blurry vision with current glasses, occured after COVID  Was advsied that vision would go back to normal which it has not   DO will no see Pt for 6 months after COVID , most recent eye exam >1 year ago and wears glasses  Dental: regular dental visits, brushes teeth twice daily and flosses teeth occasionally  /GYN Health  Last menstrual period: 5/20/2022  Contraceptive method: oral contraceptives  History of STDs?: yes  Review of Systems:     Review of Systems   Psychiatric/Behavioral: Positive for depression  All other systems reviewed and are negative       Past Medical History:     Past Medical History:   Diagnosis Date    Allergic rhinitis     Costochondritis 5/30/2018    Dyspnea on exertion 5/30/2018    Obesity     Oral contraceptive use     Plantar wart of right foot 5/29/2019      Past Surgical History:     Past Surgical History:   Procedure Laterality Date    MOUTH SURGERY  02/2022    WISDOM TOOTH EXTRACTION        Social History:     Social History     Socioeconomic History    Marital status: Single     Spouse name: None    Number of children: None    Years of education: None    Highest education level: None   Occupational History    None   Tobacco Use    Smoking status: Never Smoker    Smokeless tobacco: Never Used   Vaping Use    Vaping Use: Never used   Substance and Sexual Activity    Alcohol use: Yes     Comment: Social    Drug use: No    Sexual activity: Yes   Other Topics Concern    None   Social History Narrative    Single    Employed- Full Time    Lives at home with Parents      Social Determinants of Health     Financial Resource Strain: Not on file   Food Insecurity: Not on file   Transportation Needs: Not on file   Physical Activity: Not on file   Stress: Not on file   Social Connections: Not on file   Intimate Partner Violence: Not on file   Housing Stability: Not on file      Family History:     Family History   Problem Relation Age of Onset    Hypertension Mother     Dementia Father     Diabetes Father     Ovarian cancer Neg Hx     Breast cancer Neg Hx     Uterine cancer Neg Hx  Cervical cancer Neg Hx       Current Medications:     Current Outpatient Medications   Medication Sig Dispense Refill    buPROPion (WELLBUTRIN XL) 300 mg 24 hr tablet TAKE 1 TABLET BY MOUTH EVERY DAY 90 tablet 1    drospirenone-ethinyl estradiol (JASON) 3-0 03 MG per tablet Take 1 tablet by mouth daily 90 tablet 0    loratadine (CLARITIN) 10 mg tablet Take 10 mg by mouth daily      Omega-3 Fatty Acids (Fish Oil Burp-Less) 1000 MG CAPS Take 2 capsules (2,000 mg total) by mouth in the morning 180 capsule 1    traZODone (DESYREL) 50 mg tablet TAKE 1 TABLET BY MOUTH DAILY AT BEDTIME AS NEEDED FOR SLEEP  90 tablet 1     No current facility-administered medications for this visit  Allergies: Allergies   Allergen Reactions    Pollen Extract Allergic Rhinitis      Physical Exam:     /78 (BP Location: Left arm, Patient Position: Sitting, Cuff Size: Large)   Pulse 93   Temp 98 7 °F (37 1 °C) (Tympanic)   Ht 5' 8" (1 727 m)   Wt 135 kg (298 lb)   SpO2 98%   BMI 45 31 kg/m²     Physical Exam  Vitals and nursing note reviewed  Constitutional:       Appearance: Normal appearance  She is well-developed  She is obese  HENT:      Head: Normocephalic and atraumatic  Right Ear: Tympanic membrane, ear canal and external ear normal       Left Ear: Tympanic membrane, ear canal and external ear normal       Nose: Nose normal       Mouth/Throat:      Mouth: Mucous membranes are moist    Eyes:      General: Lids are normal  Vision grossly intact  Conjunctiva/sclera: Conjunctivae normal       Pupils: Pupils are equal, round, and reactive to light  Cardiovascular:      Rate and Rhythm: Normal rate and regular rhythm  Pulses: Normal pulses  Heart sounds: Normal heart sounds, S1 normal and S2 normal      No friction rub  No gallop  No S3 sounds  Pulmonary:      Effort: Pulmonary effort is normal       Breath sounds: Normal breath sounds     Abdominal:      General: Bowel sounds are normal       Palpations: Abdomen is soft  Tenderness: There is no abdominal tenderness  Genitourinary:     Comments: Deferred  Musculoskeletal:         General: Normal range of motion  Cervical back: Normal range of motion and neck supple  Right lower leg: No edema  Left lower leg: No edema  Lymphadenopathy:      Cervical: No cervical adenopathy  Skin:     General: Skin is warm and dry  Capillary Refill: Capillary refill takes less than 2 seconds  Neurological:      General: No focal deficit present  Mental Status: She is alert and oriented to person, place, and time  Motor: Motor function is intact  Gait: Gait is intact  Psychiatric:         Attention and Perception: Attention and perception normal          Mood and Affect: Mood and affect normal          Speech: Speech normal          Behavior: Behavior normal  Behavior is cooperative  Thought Content: Thought content normal          Cognition and Memory: Cognition and memory normal          Judgment: Judgment normal         Appointment on 06/08/2022   Component Date Value Ref Range Status    Cholesterol 06/08/2022 213 (A) See Comment mg/dL Final    Cholesterol:         Pediatric <18 Years        Desirable          <170 mg/dL      Borderline High    170-199 mg/dL      High               >=200 mg/dL        Adult >=18 Years            Desirable         <200 mg/dL      Borderline High   200-239 mg/dL      High              >239 mg/dL      Triglycerides 06/08/2022 393 (A) See Comment mg/dL Final    Triglyceride:     0-9Y            <75mg/dL     10Y-17Y         <90 mg/dL       >=18Y     Normal          <150 mg/dL     Borderline High 150-199 mg/dL     High            200-499 mg/dL        Very High       >499 mg/dL    Specimen collection should occur prior to N-Acetylcysteine or Metamizole administration due to the potential for falsely depressed results      HDL, Direct 06/08/2022 46 (A) >=50 mg/dL Final Specimen collection should occur prior to Metamizole administration due to the potential for falsley depressed results   LDL Calculated 06/08/2022 88  0 - 100 mg/dL Final    LDL Cholesterol:     Optimal           <100 mg/dl     Near Optimal      100-129 mg/dl     Above Optimal       Borderline High 130-159 mg/dl       High            160-189 mg/dl       Very High       >189 mg/dl         This screening LDL is a calculated result  It does not have the accuracy of the Direct Measured LDL in the monitoring of patients with hyperlipidemia and/or statin therapy  Direct Measure LDL (JZO706) must be ordered separately in these patients      WBC 06/08/2022 6 78  4 31 - 10 16 Thousand/uL Final    RBC 06/08/2022 5 70 (A) 3 81 - 5 12 Million/uL Final    Hemoglobin 06/08/2022 14 1  11 5 - 15 4 g/dL Final    Hematocrit 06/08/2022 46 0  34 8 - 46 1 % Final    MCV 06/08/2022 81 (A) 82 - 98 fL Final    MCH 06/08/2022 24 7 (A) 26 8 - 34 3 pg Final    MCHC 06/08/2022 30 7 (A) 31 4 - 37 4 g/dL Final    RDW 06/08/2022 13 3  11 6 - 15 1 % Final    MPV 06/08/2022 10 3  8 9 - 12 7 fL Final    Platelets 74/26/2636 298  149 - 390 Thousands/uL Final    nRBC 06/08/2022 0  /100 WBCs Final    Neutrophils Relative 06/08/2022 61  43 - 75 % Final    Immat GRANS % 06/08/2022 1  0 - 2 % Final    Lymphocytes Relative 06/08/2022 28  14 - 44 % Final    Monocytes Relative 06/08/2022 8  4 - 12 % Final    Eosinophils Relative 06/08/2022 1  0 - 6 % Final    Basophils Relative 06/08/2022 1  0 - 1 % Final    Neutrophils Absolute 06/08/2022 4 17  1 85 - 7 62 Thousands/µL Final    Immature Grans Absolute 06/08/2022 0 04  0 00 - 0 20 Thousand/uL Final    Lymphocytes Absolute 06/08/2022 1 90  0 60 - 4 47 Thousands/µL Final    Monocytes Absolute 06/08/2022 0 52  0 17 - 1 22 Thousand/µL Final    Eosinophils Absolute 06/08/2022 0 09  0 00 - 0 61 Thousand/µL Final    Basophils Absolute 06/08/2022 0 06  0 00 - 0 10 Thousands/µL Final    Sodium 06/08/2022 139  136 - 145 mmol/L Final    Potassium 06/08/2022 4 1  3 5 - 5 3 mmol/L Final    Chloride 06/08/2022 105  100 - 108 mmol/L Final    CO2 06/08/2022 23  21 - 32 mmol/L Final    ANION GAP 06/08/2022 11  4 - 13 mmol/L Final    BUN 06/08/2022 10  5 - 25 mg/dL Final    Creatinine 06/08/2022 0 97  0 60 - 1 30 mg/dL Final    Standardized to IDMS reference method    Glucose, Fasting 06/08/2022 96  65 - 99 mg/dL Final    Specimen collection should occur prior to Sulfasalazine administration due to the potential for falsely depressed results  Specimen collection should occur prior to Sulfapyridine administration due to the potential for falsely elevated results   Calcium 06/08/2022 9 6  8 3 - 10 1 mg/dL Final    AST 06/08/2022 18  5 - 45 U/L Final    Specimen collection should occur prior to Sulfasalazine administration due to the potential for falsely depressed results   ALT 06/08/2022 29  12 - 78 U/L Final    Specimen collection should occur prior to Sulfasalazine administration due to the potential for falsely depressed results   Alkaline Phosphatase 06/08/2022 48  46 - 116 U/L Final    Total Protein 06/08/2022 7 6  6 4 - 8 2 g/dL Final    Albumin 06/08/2022 3 7  3 5 - 5 0 g/dL Final    Total Bilirubin 06/08/2022 0 53  0 20 - 1 00 mg/dL Final    Use of this assay is not recommended for patients undergoing treatment with eltrombopag due to the potential for falsely elevated results   eGFR 06/08/2022 78  ml/min/1 73sq m Final    TSH 3RD GENERATON 06/08/2022 1 400  0 450 - 4 500 uIU/mL Final    The recommended reference ranges for TSH during pregnancy are as follows:   First trimester 0 1 to 2 5 uIU/mL   Second trimester  0 2 to 3 0 uIU/mL   Third trimester 0 3 to 3 0 uIU/m    Note: Normal ranges may not apply to patients who are transgender, non-binary, or whose legal sex, sex at birth, and gender identity differ    Adult TSH (3rd generation) reference range follows the recommended guidelines of the [employer], January, 2020  Orders Only on 06/01/2022   Component Date Value Ref Range Status    Urine Culture 06/01/2022 20,000-29,000 cfu/ml Escherichia coli (A)  Final    This organism has been edited  The previous result was Gram Negative Gregg Enteric Like on 6/3/2022 at 1043 EDT   Urine Culture 06/01/2022 <10,000 cfu/ml    Final    Mixed Contaminants X2     I have reviewed all the lab results  There are some abnormalities that I have discussed these in person at this office appointment      Kareen Morales, [address] [place] [address]

## 2022-06-15 NOTE — PATIENT INSTRUCTIONS
Wellness Visit for Adults   AMBULATORY CARE:   A wellness visit  is when you see your healthcare provider to get screened for health problems  Your healthcare provider will also give you advice on how to stay healthy  Write down your questions so you remember to ask them  Ask your healthcare provider how often you should have a wellness visit  What happens at a wellness visit:  Your healthcare provider will ask about your health, and your family history of health problems  This includes high blood pressure, heart disease, and cancer  He or she will ask if you have symptoms that concern you, if you smoke, and about your mood  You may also be asked about your intake of medicines, supplements, food, and alcohol  Any of the following may be done: Your weight  will be checked  Your height may also be checked so your body mass index (BMI) can be calculated  Your BMI shows if you are at a healthy weight  Your blood pressure  and heart rate will be checked  Your temperature may also be checked  Blood and urine tests  may be done  Blood tests may be done to check your cholesterol levels  Abnormal cholesterol levels increase your risk for heart disease and stroke  You may also need a blood or urine test to check for diabetes if you are at increased risk  Urine tests may be done to look for signs of an infection or kidney disease  A physical exam  includes checking your heartbeat and lungs with a stethoscope  Your healthcare provider may also check your skin to look for sun damage  Screening tests  may be recommended  A screening test is done to check for diseases that may not cause symptoms  The screening tests you may need depend on your age, gender, family history, and lifestyle habits  For example, colorectal screening may be recommended if you are 48years old or older  Screening tests you need if you are a woman:   A Pap smear  is used to screen for cervical cancer   Pap smears are usually done every 3 to 5 years depending on your age  You may need them more often if you have had abnormal Pap smear test results in the past  Ask your healthcare provider how often you should have a Pap smear  A mammogram  is an x-ray of your breasts to screen for breast cancer  Experts recommend mammograms every 2 years starting at age 48 years  You may need a mammogram at age 52 years or younger if you have an increased risk for breast cancer  Talk to your healthcare provider about when you should start having mammograms and how often you need them  Vaccines you may need:   Get an influenza vaccine  every year  The influenza vaccine protects you from the flu  Several types of viruses cause the flu  The viruses change over time, so new vaccines are made each year  Get a tetanus-diphtheria (Td) booster vaccine  every 10 years  This vaccine protects you against tetanus and diphtheria  Tetanus is a severe infection that may cause painful muscle spasms and lockjaw  Diphtheria is a severe bacterial infection that causes a thick covering in the back of your mouth and throat  Get a human papillomavirus (HPV) vaccine  if you are female and aged 23 to 32 or male 23 to 24 and never received it  This vaccine protects you from HPV infection  HPV is the most common infection spread by sexual contact  HPV may also cause vaginal, penile, and anal cancers  Get a pneumococcal vaccine  if you are aged 72 years or older  The pneumococcal vaccine is an injection given to protect you from pneumococcal disease  Pneumococcal disease is an infection caused by pneumococcal bacteria  The infection may cause pneumonia, meningitis, or an ear infection  Get a shingles vaccine  if you are 60 or older, even if you have had shingles before  The shingles vaccine is an injection to protect you from the varicella-zoster virus  This is the same virus that causes chickenpox   Shingles is a painful rash that develops in people who had chickenpox or have been exposed to the virus  How to eat healthy:  My Plate is a model for planning healthy meals  It shows the types and amounts of foods that should go on your plate  Fruits and vegetables make up about half of your plate, and grains and protein make up the other half  A serving of dairy is included on the side of your plate  The amount of calories and serving sizes you need depends on your age, gender, weight, and height  Examples of healthy foods are listed below:  Eat a variety of vegetables  such as dark green, red, and orange vegetables  You can also include canned vegetables low in sodium (salt) and frozen vegetables without added butter or sauces  Eat a variety of fresh fruits , canned fruit in 100% juice, frozen fruit, and dried fruit  Include whole grains  At least half of the grains you eat should be whole grains  Examples include whole-wheat bread, wheat pasta, brown rice, and whole-grain cereals such as oatmeal     Eat a variety of protein foods such as seafood (fish and shellfish), lean meat, and poultry without skin (turkey and chicken)  Examples of lean meats include pork leg, shoulder, or tenderloin, and beef round, sirloin, tenderloin, and extra lean ground beef  Other protein foods include eggs and egg substitutes, beans, peas, soy products, nuts, and seeds  Choose low-fat dairy products such as skim or 1% milk or low-fat yogurt, cheese, and cottage cheese  Limit unhealthy fats  such as butter, hard margarine, and shortening  Exercise:  Exercise at least 30 minutes per day on most days of the week  Some examples of exercise include walking, biking, dancing, and swimming  You can also fit in more physical activity by taking the stairs instead of the elevator or parking farther away from stores  Include muscle strengthening activities 2 days each week  Regular exercise provides many health benefits   It helps you manage your weight, and decreases your risk for type 2 diabetes, heart disease, stroke, and high blood pressure  Exercise can also help improve your mood  Ask your healthcare provider about the best exercise plan for you  General health and safety guidelines:   Do not smoke  Nicotine and other chemicals in cigarettes and cigars can cause lung damage  Ask your healthcare provider for information if you currently smoke and need help to quit  E-cigarettes or smokeless tobacco still contain nicotine  Talk to your healthcare provider before you use these products  Limit alcohol  A drink of alcohol is 12 ounces of beer, 5 ounces of wine, or 1½ ounces of liquor  Lose weight, if needed  Being overweight increases your risk of certain health conditions  These include heart disease, high blood pressure, type 2 diabetes, and certain types of cancer  Protect your skin  Do not sunbathe or use tanning beds  Use sunscreen with a SPF 15 or higher  Apply sunscreen at least 15 minutes before you go outside  Reapply sunscreen every 2 hours  Wear protective clothing, hats, and sunglasses when you are outside  Drive safely  Always wear your seatbelt  Make sure everyone in your car wears a seatbelt  A seatbelt can save your life if you are in an accident  Do not use your cell phone when you are driving  This could distract you and cause an accident  Pull over if you need to make a call or send a text message  Practice safe sex  Use latex condoms if are sexually active and have more than one partner  Your healthcare provider may recommend screening tests for sexually transmitted infections (STIs)  Wear helmets, lifejackets, and protective gear  Always wear a helmet when you ride a bike or motorcycle, go skiing, or play sports that could cause a head injury  Wear protective equipment when you play sports  Wear a lifejacket when you are on a boat or doing water sports      © Copyright [employer] 2022 Information is for End User's use only and may not be sold, redistributed or otherwise used for commercial purposes  All illustrations and images included in CareNotes® are the copyrighted property of [employer]  or Luly Avila  The above information is an  only  It is not intended as medical advice for individual conditions or treatments  Talk to your doctor, nurse or pharmacist before following any medical regimen to see if it is safe and effective for you  Weight Management   AMBULATORY CARE:   Why it is important to manage your weight:  Being overweight increases your risk of health conditions such as heart disease, high blood pressure, type 2 diabetes, and certain types of cancer  It can also increase your risk for osteoarthritis, sleep apnea, and other respiratory problems  Aim for a slow, steady weight loss  Even a small amount of weight loss can lower your risk of health problems  Risks of being overweight:  Extra weight can cause many health problems, including the following:  Diabetes (high blood sugar level)    High blood pressure or high cholesterol    Heart disease    Stroke    Gallbladder or liver disease    Cancer of the colon, breast, prostate, liver, or kidney    Sleep apnea    Arthritis or gout    Screening  is done to check for health conditions before you have signs or symptoms  If you are 28to 79years old, your blood sugar level may be checked every 3 years for signs of prediabetes or diabetes  Your healthcare provider will check your blood pressure at each visit  High blood pressure can lead to a stroke or other problems  Your provider may check for signs of heart disease, cancer, or other health problems  How to lose weight safely:  A safe and healthy way to lose weight is to eat fewer calories and get regular exercise  You can lose up about 1 pound a week by decreasing the number of calories you eat by 500 calories each day  You can decrease calories by eating smaller portion sizes or by cutting out high-calorie foods  Read labels to find out how many calories are in the foods you eat  You can also burn calories with exercise such as walking, swimming, or biking  You will be more likely to keep weight off if you make these changes part of your lifestyle  Exercise at least 30 minutes per day on most days of the week  You can also fit in more physical activity by taking the stairs instead of the elevator or parking farther away from stores  Ask your healthcare provider about the best exercise plan for you  Healthy meal plan for weight management:  A healthy meal plan includes a variety of foods, contains fewer calories, and helps you stay healthy  A healthy meal plan includes the following:     Eat whole-grain foods more often  A healthy meal plan should contain fiber  Fiber is the part of grains, fruits, and vegetables that is not broken down by your body  Whole-grain foods are healthy and provide extra fiber in your diet  Some examples of whole-grain foods are whole-wheat breads and pastas, oatmeal, brown rice, and bulgur  Eat a variety of vegetables every day  Include dark, leafy greens such as spinach, kale, genaro greens, and mustard greens  Eat yellow and orange vegetables such as carrots, sweet potatoes, and winter squash  Eat a variety of fruits every day  Choose fresh or canned fruit (canned in its own juice or light syrup) instead of juice  Fruit juice has very little or no fiber  Eat low-fat dairy foods  Drink fat-free (skim) milk or 1% milk  Eat fat-free yogurt and low-fat cottage cheese  Try low-fat cheeses such as mozzarella and other reduced-fat cheeses  Choose meat and other protein foods that are low in fat  Choose beans or other legumes such as split peas or lentils  Choose fish, skinless poultry (chicken or turkey), or lean cuts of red meat (beef or pork)  Before you cook meat or poultry, cut off any visible fat  Use less fat and oil  Try baking foods instead of frying them  Add less fat, such as margarine, sour cream, regular salad dressing and mayonnaise to foods  Eat fewer high-fat foods  Some examples of high-fat foods include french fries, doughnuts, ice cream, and cakes  Eat fewer sweets  Limit foods and drinks that are high in sugar  This includes candy, cookies, regular soda, and sweetened drinks  Ways to decrease calories:   Eat smaller portions  Use a small plate with smaller servings  Do not eat second helpings  When you eat at a restaurant, ask for a box and place half of your meal in the box before you eat  Share an entrée with someone else  Replace high-calorie snacks with healthy, low-calorie snacks  Choose fresh fruit, vegetables, fat-free rice cakes, or air-popped popcorn instead of potato chips, nuts, or chocolate  Choose water or calorie-free drinks instead of soda or sweetened drinks  Do not shop for groceries when you are hungry  You may be more likely to make unhealthy food choices  Take a grocery list of healthy foods and shop after you have eaten  Eat regular meals  Do not skip meals  Skipping meals can lead to overeating later in the day  This can make it harder for you to lose weight  Eat a healthy snack in place of a meal if you do not have time to eat a regular meal  Talk with a dietitian to help you create a meal plan and schedule that is right for you  Other things to consider as you try to lose weight:   Be aware of situations that may give you the urge to overeat, such as eating while watching television  Find ways to avoid these situations  For example, read a book, go for a walk, or do crafts  Meet with a weight loss support group or friends who are also trying to lose weight  This may help you stay motivated to continue working on your weight loss goals  © Copyright [employer] 2022 Information is for End User's use only and may not be sold, redistributed or otherwise used for commercial purposes   All illustrations and images included in CareNotes® are the copyrighted property of [employer]  or Luly Trinidad   The above information is an  only  It is not intended as medical advice for individual conditions or treatments  Talk to your doctor, nurse or pharmacist before following any medical regimen to see if it is safe and effective for you  Obesity   AMBULATORY CARE:   Obesity  means your body mass index (BMI) is greater than 30  Your healthcare provider will use your height and weight to measure your BMI  The risks of obesity include  many health problems, including injuries or physical disability  Diabetes (high blood sugar level)    High blood pressure or high cholesterol    Heart disease    Stroke    Gallbladder or liver disease    Cancer of the colon, breast, prostate, liver, or kidney    Sleep apnea    Arthritis or gout    Screening  is done to check for health conditions before you have signs or symptoms  If you are 28to 79years old, your blood sugar level may be checked every 3 years for signs of prediabetes or diabetes  Your healthcare provider will check your blood pressure at each visit  High blood pressure can lead to a stroke or other problems  Your provider may check for signs of heart disease, cancer, or other health problems  Seek care immediately if:   You have a severe headache, confusion, or difficulty speaking  You have weakness on one side of your body  You have chest pain, sweating, or shortness of breath  Call your doctor if:   You have symptoms of gallbladder or liver disease, such as pain in your upper abdomen  You have knee or hip pain and discomfort while walking  You have symptoms of diabetes, such as intense hunger and thirst, and frequent urination  You have symptoms of sleep apnea, such as snoring or daytime sleepiness  You have questions or concerns about your condition or care      Treatment for obesity  focuses on helping you lose weight to improve your health  Even a small decrease in BMI can reduce the risk for many health problems  Your healthcare provider will help you set a weight-loss goal   Lifestyle changes  are the first step in treating obesity  These include making healthy food choices and getting regular physical activity  Your healthcare provider may suggest a weight-loss program that involves coaching, education, and therapy  Medicine  may help you lose weight when it is used with a healthy foods and physical activity  Surgery  can help you lose weight if you are very obese and have other health problems  There are several types of weight-loss surgery  Ask your healthcare provider for more information  Tips for safe weight loss:   Set small, realistic goals  An example of a small goal is to walk for 20 minutes 5 days a week  Anther goal is to lose 5% of your body weight  Tell friends, family members, and coworkers about your goals  and ask for their support  Ask a friend to lose weight with you, or join a weight-loss support group  Identify foods or triggers that may cause you to overeat , and find ways to avoid them  Remove tempting high-calorie foods from your home and workplace  Place a bowl of fresh fruit on your kitchen counter  If stress causes you to eat, then find other ways to cope with stress  A counselor or therapist may be able to help you  Keep a diary to track what you eat and drink  Also write down how many minutes of physical activity you do each day  Weigh yourself once a week and record it in your diary  Eating changes: You will need to eat 500 to 1,000 fewer calories each day than you currently eat to lose 1 to 2 pounds a week  The following changes will help you cut calories:  Eat smaller portions  Use small plates, no larger than 9 inches in diameter  Fill your plate half full of fruits and vegetables   Measure your food using measuring cups until you know what a serving size looks like          Eat 3 meals and 1 or 2 snacks each day  Plan your meals in advance  [place] and eat at home most of the time  Eat slowly  Do not skip meals  Skipping meals can lead to overeating later in the day  This can make it harder for you to lose weight  Talk with a dietitian to help you make a meal plan and schedule that is right for you  Eat fruits and vegetables at every meal   They are low in calories and high in fiber, which makes you feel full  Do not add butter, margarine, or cream sauce to vegetables  Use herbs to season steamed vegetables  Eat less fat and fewer fried foods  Eat more baked or grilled chicken and fish  These protein sources are lower in calories and fat than red meat  Limit fast food  Dress your salads with olive oil and vinegar instead of bottled dressing  Limit the amount of sugar you eat  Do not drink sugary beverages  Limit alcohol  Activity changes:  Physical activity is good for your body in many ways  It helps you burn calories and build strong muscles  It decreases stress and depression, and improves your mood  It can also help you sleep better  Talk to your healthcare provider before you begin an exercise program   Exercise for at least 30 minutes 5 days a week  Start slowly  Set aside time each day for physical activity that you enjoy and that is convenient for you  It is best to do both weight training and an activity that increases your heart rate, such as walking, bicycling, or swimming  Find ways to be more active  Do yard work and housecleaning  Walk up the stairs instead of using elevators  Spend your leisure time going to events that require walking, such as outdoor festivals or fairs  This extra physical activity can help you lose weight and keep it off  Follow up with your doctor as directed: You may need to meet with a dietitian  Write down your questions so you remember to ask them during your visits    © Copyright [employer] 2022 Information is for End User's use only and may not be sold, redistributed or otherwise used for commercial purposes  All illustrations and images included in CareNotes® are the copyrighted property of [employer]  or Luly Avila  The above information is an  only  It is not intended as medical advice for individual conditions or treatments  Talk to your doctor, nurse or pharmacist before following any medical regimen to see if it is safe and effective for you  Cholesterol and Your Health   AMBULATORY CARE:   Cholesterol  is a waxy, fat-like substance  Cholesterol is made by your body, but also comes from certain foods you eat  Your body uses cholesterol to make hormones and new cells  Your body also uses cholesterol to protect nerves  Cholesterol comes from foods such as meat and dairy products  Your total cholesterol level is made up by LDL cholesterol, HDL cholesterol, and triglycerides:  LDL cholesterol  is called bad cholesterol  because it forms plaque in your arteries  As plaque builds up, your arteries become narrow, and less blood flows through  When plaque decreases blood flow to your heart, you may have chest pain  If plaque completely blocks an artery that bring blood to your heart, you may have a heart attack  Plaque can break off and form blood clots  Blood clots may block arteries in your brain and cause a stroke  HDL cholesterol  is called good cholesterol  because it helps remove LDL cholesterol from your arteries  It does this by attaching to LDL cholesterol and carrying it to your liver  Your liver breaks down LDL cholesterol so your body can get rid of it  High levels of HDL cholesterol can help prevent a heart attack and stroke  Low levels of HDL cholesterol can increase your risk for heart disease, heart attack, and stroke  Triglycerides  are a type of fat that store energy from foods you eat  High levels of triglycerides also cause plaque buildup   This can increase your risk for a heart attack or stroke  If your triglyceride level is high, your LDL cholesterol level may also be high  How food affects your cholesterol levels:   Unhealthy fats  increase LDL cholesterol and triglyceride levels in your blood  They are found in foods high in cholesterol, saturated fat, and trans fat:     Cholesterol  is found in eggs, dairy, and meat  Saturated fat  is found in butter, cheese, ice cream, whole milk, and coconut oil  Saturated fat is also found in meat, such as sausage, hot dogs, and bologna  Trans fat  is found in liquid oils and is used in fried and baked foods  Foods that contain trans fats include chips, crackers, muffins, sweet rolls, microwave popcorn, and cookies  Healthy fats,  also called unsaturated fats, help lower LDL cholesterol and triglyceride levels  Healthy fats include the following:     Monounsaturated fats  are found in foods such as olive oil, canola oil, avocado, nuts, and olives  Polyunsaturated fats,  such as omega 3 fats, are found in fish, such as salmon, trout, and tuna  They can also be found in plant foods such as flaxseed, walnuts, and soybeans  Other things that affect your cholesterol levels:   Smoking cigarettes    Being overweight or obese     Drinking large amounts of alcohol    Not enough exercise or no exercise    Certain genes passed from your parents to you  What you need to know about having your cholesterol levels checked: Adults 21to 39years of age should have their cholesterol levels checked every 4 to 6 years  Adults 45 years and older should have their cholesterol checked every 1 to 2 years  You may need your cholesterol checked more often, or at a younger age, if you have risk factors for heart disease  You may also need to have your cholesterol checked more often if you have other health conditions, such as diabetes  Blood tests are used to check cholesterol levels   Blood tests measure your levels of triglycerides, LDL cholesterol, and HDL cholesterol  Cholesterol level goals: Your cholesterol level goal may depend on your risk for heart disease  It may also depend on your age and other health conditions  Ask your healthcare provider if the following goals are right for you: Your total cholesterol level  should be less than 200 mg/dL  This number may also depend on your HDL and LDL cholesterol goals  Your LDL cholesterol level  should be less than 130 mg/dL  Your HDL cholesterol level  should be 60 mg/dL or higher  Your triglyceride level  should be less than 150 mg/dL  Treatment for high cholesterol:  Treatment for high cholesterol will also decrease your risk of heart disease, heart attack, and stroke  Treatment may include any of the following:  Medicines  may be given to lower your LDL cholesterol, triglyceride levels, or total cholesterol level  You may need medicines to lower your cholesterol if any of the following is true:     You have a history of stroke, TIA, unstable angina, or a heart attack    Your LDL cholesterol level is 190 mg/dL or higher    You are age 36to 76years of age, have diabetes, and your LDL cholesterol is 70 mg/dL or higher    You are age 36to 76years of age, have risk factors for heart disease, and your LDL cholesterol is 70 mg/dL or higher    Lifestyle changes  include changes to your diet, exercise, weight loss, and quitting smoking  It also includes decreasing the amount of alcohol you drink  Supplements  include fish oil, red yeast rice, and garlic  Fish oil may help lower your triglyceride and LDL cholesterol levels  It may also increase your HDL cholesterol level  Red yeast rice may help decrease your total cholesterol level and LDL cholesterol level  Garlic may help lower your total cholesterol level  Do not take these supplements without talking to your healthcare provider     Nutrition to help lower your cholesterol levels:  A registered dietitian can help you create a healthy eating plan  Read food labels and choose foods low in saturated fat, trans fats, and cholesterol  Decrease the total amount of fat you eat  Choose lean meats, fat-free or 1% fat milk, and low-fat dairy products, such as yogurt and cheese  Try to limit or avoid red meats  Limit or do not eat fried foods or baked goods such as cookies  Replace unhealthy fats with healthy fats  Cook foods in olive oil or canola oil  Choose soft margarines that are low in saturated fat and trans fat  Seeds, nuts, and avocados are other examples of healthy fats  Eat foods with omega-3 fats  Examples include salmon, tuna, mackerel, walnuts, and flaxseed  Eat fish 2 times per week  Children and pregnant women should not eat fish that have high levels of mercury, such as shark, swordfish, and senia mackerel  Increase the amount of plant-based foods you eat  Plant-based foods are low in cholesterol and fat  Eating more of these foods may help lower your cholesterol and help you lose weight  Examples of plant-based foods includes fruits, vegetables, legumes, and whole grains  Replace milk that contains dairy with almond, soy, or coconut milk  Eat beans and foods with soy for protein instead of meat  Ask your healthcare provider or dietitian for more information on plant-based foods  Increase the amount of fiber you eat  High-fiber foods can help lower your LDL cholesterol  You should eat between 20 and 30 grams of fiber each day  Eat at least 5 servings of fruits and vegetables each day  Other examples of high-fiber foods include whole-grain or whole-wheat breads, pastas, or cereals, and brown rice  Eat 3 ounces of whole-grain foods each day  Increase fiber slowly  You may have abdominal discomfort, bloating, and gas if you add fiber to your diet too quickly  Lifestyle changes you can make to help lower your cholesterol levels:   Maintain a healthy weight    Ask your healthcare provider how much you should weigh  Ask him or her to help you create a weight loss plan if you are overweight  Weight loss can decrease your total cholesterol and triglyceride levels  Exercise regularly  Exercise can help lower your total cholesterol level and maintain a healthy weight  Exercise can also help increase your HDL cholesterol level  Work with your healthcare provider to create an exercise program that is right for you  Get at least 30 minutes of moderate exercise most days of the week  Examples of exercise include brisk walking, swimming, or biking  Do not smoke  Nicotine and other chemicals in cigarettes and cigars can damage your lungs, heart, and blood vessels  They can also raise your triglyceride levels  Ask your healthcare provider for information if you currently smoke and need help to quit  E-cigarettes or smokeless tobacco still contain nicotine  Talk to your healthcare provider before you use these products  Limit or do not drink alcohol  Alcohol can increase your triglyceride levels  Ask your healthcare provider if it is safe for you to drink alcohol  Also ask how much is safe for you to drink each day  © 2017 [address] [facility] Information is for End User's use only and may not be sold, redistributed or otherwise used for commercial purposes  All illustrations and images included in CareNotes® are the copyrighted property of [employer]  or [employer]ro[employer] Dominguez  The above information is an  only  It is not intended as medical advice for individual conditions or treatments  Talk to your doctor, nurse or pharmacist before following any medical regimen to see if it is safe and effective for you  Lipid Profile   AMBULATORY CARE:   A lipid profile,  or lipid panel, is a blood test to check your lipid levels  Lipids are fats that cannot dissolve in blood  High lipid levels increase your risk for heart disease and a heart attack or stroke   A lipid profile includes the following: Total cholesterol  is the main number used for cholesterol values  Goal: Less than 200 mg/dL    Borderline high: 200 to 239 mg/dL    High: 240 mg/dL or higher    LDL (bad) cholesterol  carries cholesterol and deposits it in the arteries  This can cause a blockage  Goal: 100 mg/dL or lower    Near goal: 100 to 129 mg/dL     Borderline high: 130 to 159 mg/dL    High: 160 to 189 mg/dL    Very high: 190 mg/dL or higher    HDL (good) cholesterol  removes cholesterol from your body  Goal: 60 mg/dL or higher    Borderline risk: 40 to 59 mg/dL    High risk: 40 mg/dL or lower    Triglycerides  are a different kind of fat than cholesterol  Goal: 150 mg/dL or lower    Borderline high: 150 to 199 mg/dL    High: 200 to 499 mg/dL    Very high: 500 mg/dL or higher  How to prepare for the test:  Do not eat or drink anything, except water, for 12 to 14 hours before the test  Ask your healthcare provider if you should take your medicines on the day of your test    What you need to know about your test results: Your healthcare provider will discuss your test results with you  If your test results are abnormal, you may need treatment to decrease your risk for heart disease  © 2017 [employer] [employer]  Information is for End User's use only and may not be sold, redistributed or otherwise used for commercial purposes  All illustrations and images included in CareNotes® are the copyrighted property of [employer]  or [employer]ro[employer] Dominguez  The above information is an  only  It is not intended as medical advice for individual conditions or treatments  Talk to your doctor, nurse or pharmacist before following any medical regimen to see if it is safe and effective for you  Low Fat Diet   AMBULATORY CARE:   A low-fat diet  is an eating plan that is low in total fat, unhealthy fat, and cholesterol  You may need to follow a low-fat diet if you have trouble digesting or absorbing fat  You may also need to follow this diet if you have high cholesterol  You can also lower your cholesterol by increasing the amount of fiber in your diet  Soluble fiber is a type of fiber that helps to decrease cholesterol levels  Different types of fat in food:   Limit unhealthy fats  A diet that is high in cholesterol, saturated fat, and trans fat may cause unhealthy cholesterol levels  Unhealthy cholesterol levels increase your risk of heart disease  Cholesterol:  Limit intake of cholesterol to less than 200 mg per day  Cholesterol is found in meat, eggs, and dairy  Saturated fat:  Limit saturated fat to less than 7% of your total daily calories  Ask your dietitian how many calories you need each day  Saturated fat is found in butter, cheese, ice cream, whole milk, and palm oil  Saturated fat is also found in meat, such as beef, pork, chicken skin, and processed meats  Processed meats include sausage, hot dogs, and bologna  Trans fat:  Avoid trans fat as much as possible  Trans fat is used in fried and baked foods  Foods that say trans fat free on the label may still have up to 0 5 grams of trans fat per serving  Include healthy fats  Replace foods that are high in saturated and trans fat with foods high in healthy fats  This may help to decrease high cholesterol levels  Monounsaturated fats: These are found in avocados, nuts, and vegetable oils, such as olive, canola, and sunflower oil  Polyunsaturated fats: These can be found in vegetable oils, such as soybean or corn oil  Omega-3 fats can help to decrease the risk of heart disease  Omega-3 fats are found in fish, such as salmon, herring, trout, and tuna  Omega-3 fats can also be found in plant foods, such as walnuts, flaxseed, soybeans, and canola oil    Foods to limit or avoid:   Grains:      Snacks that are made with partially hydrogenated oils, such as chips, regular crackers, and butter-flavored popcorn    High-fat baked goods, such as biscuits, croissants, doughnuts, pies, cookies, and pastries    Dairy:      Whole milk, 2% milk, and yogurt and ice cream made with whole milk    Half and half creamer, heavy cream, and whipping cream    Cheese, cream cheese, and sour cream    Meats and proteins:      High-fat cuts of meat (T-bone steak, regular hamburger, and ribs)    Fried meat, poultry (turkey and chicken), and fish    Poultry (chicken and turkey) with skin    Cold cuts (salami or bologna), hot dogs, franklin, and sausage    Whole eggs and egg yolks    Vegetables and fruits with added fat:      Fried vegetables or vegetables in butter or high-fat sauces, such as cream or cheese sauces    Fried fruit or fruit served with butter or cream    Fats:      Butter, stick margarine, and shortening    Coconut, palm oil, and palm kernel oil  Foods to include:   Grains:      Whole-grain breads, cereals, pasta, and brown rice    Low-fat crackers and pretzels    Vegetables and fruits:      Fresh, frozen, or canned vegetables (no salt or low-sodium)    Fresh, frozen, dried, or canned fruit (canned in light syrup or fruit juice)    Avocado    Low-fat dairy products:      Nonfat (skim) or 1% milk    Nonfat or low-fat cheese, yogurt, and cottage cheese    Meats and proteins:      Chicken or turkey with no skin    Baked or broiled fish    Lean beef and pork (loin, round, extra lean hamburger)    Beans and peas, unsalted nuts, soy products    Egg whites and substitutes    Seeds and nuts    Fats:      Unsaturated oil, such as canola, olive, peanut, soybean, or sunflower oil    Soft or liquid margarine and vegetable oil spread    Low-fat salad dressing  Other ways to decrease fat:   Read food labels before you buy foods  Choose foods that have less than 30% of calories from fat  Choose low-fat or fat-free dairy products  Remember that fat free does not mean calorie free  These foods still contain calories, and too many calories can lead to weight gain       Trim fat from meat and avoid fried food  Trim all visible fat from meat before you cook it  Remove the skin from poultry  Do not schneider meat, fish, or poultry  Bake, roast, boil, or broil these foods instead  Avoid fried foods  Eat a baked potato instead of [place] fries  Steam vegetables instead of sautéing them in butter  Add less fat to foods  Use imitation franklin bits on salads and baked potatoes instead of regular franklin bits  Use fat-free or low-fat salad dressings instead of regular dressings  Use low-fat or nonfat butter-flavored topping instead of regular butter or margarine on popcorn and other foods  Ways to decrease fat in recipes:  Replace high-fat ingredients with low-fat or nonfat ones  This may cause baked goods to be drier than usual  You may need to use nonfat cooking spray on pans to prevent food from sticking  You also may need to change the amount of other ingredients, such as water, in the recipe  Try the following:  Use low-fat or light margarine instead of regular margarine or shortening  Use lean ground turkey breast or chicken, or lean ground beef (less than 5% fat) instead of hamburger  Add 1 teaspoon of canola oil to 8 ounces of skim milk instead of using cream or half and half  Use grated zucchini, carrots, or apples in breads instead of coconut  Use blenderized, low-fat cottage cheese, plain tofu, or low-fat ricotta cheese instead of cream cheese  Use 1 egg white and 1 teaspoon of canola oil, or use ¼ cup (2 ounces) of fat-free egg substitute instead of a whole egg  Replace half of the oil that is called for in a recipe with applesauce when you bake  Use 3 tablespoons of cocoa powder and 1 tablespoon of canola oil instead of a square of baking chocolate  How to increase fiber:  Eat enough high-fiber foods to get 20 to 30 grams of fiber every day  Slowly increase your fiber intake to avoid stomach cramps, gas, and other problems    Eat 3 ounces of whole-grain foods each day   An ounce is about 1 slice of bread  Eat whole-grain breads, such as whole-wheat bread  Whole wheat, whole-wheat flour, or other whole grains should be listed as the first ingredient on the food label  Replace white flour with whole-grain flour or use half of each in recipes  Whole-grain flour is heavier than white flour, so you may have to add more yeast or baking powder  Eat a high-fiber cereal for breakfast   Oatmeal is a good source of soluble fiber  Look for cereals that have bran or fiber in the name  Choose whole-grain products, such as brown rice, barley, and whole-wheat pasta  Eat more beans, peas, and lentils  For example, add beans to soups or salads  Eat at least 5 cups of fruits and vegetables each day  Eat fruits and vegetables with the peel because the peel is high in fiber  © 2017 [address] [employer] Information is for End User's use only and may not be sold, redistributed or otherwise used for commercial purposes  All illustrations and images included in CareNotes® are the copyrighted property of [employer]  or [employer] Dominguez  The above information is an  only  It is not intended as medical advice for individual conditions or treatments  Talk to your doctor, nurse or pharmacist before following any medical regimen to see if it is safe and effective for you        Cholesterol tips     Foods to avoid:  ·         Cut back on saturated fats and trans fats - also called hydrogenated fats  ·         Fatty meats, cold cuts, franklin, sausage  ·         Creamy sauces and fatty gravies  ·         Fried foods  ·         Egg yolks  ·         Shrimp  ·         Coconuts  ·         Shortening, butter, coconut oil, palm oil, hydrogenated oils, partially       hydrogenated margarine and lard   ·         High fat dairy products such as whole milk, cheese, ice cream  ·         Simple sugars (found in soft drinks, candy, cakes, cookies and other     baked goods) Healthier Choices:  ·         Lean beef, skinless white meat poultry, fish  ·         Tomato sauce, vegetable purée  ·         Dried fruit, bagels, bread with jam  ·         Baked, boiled, steamed, roasted foods  ·         Egg whites or egg substitute  ·         Canola-based margarines (e g  Benacol, I can't Believe it's not Butter,   Smart Balance)  ·         Low-fat or nonfat dairy products such as 1% or fat free milk, reduced fat           cheese, nonfat frozen yogurt     Foods that will help lower cholesterol:  ·         High-fiber foods that contain lots of oats, barley, whole grains  ·         Beans  ·         Foods rich in antioxidants, such as fruits and vegetables - try for 5 -6     servings per day   ·         Cornmeal, popcorn  ·         Fatty fish such as salmon, nell, white albacore tuna, sardines,        mackerel, tilapia  ·         Foods rich in plant sterols, such as nuts like walnuts and almonds  ·         Pumpkin, sesame, or sunflower seeds   ·         Foods high in omega-3 fatty acids, such as avocado, flax seeds, olive oil          and canola oil      Increase your exercise  ·         To keep your heart healthy try for  30 minutes of repetitive exercise daily,         5 days per week (walking, running, cycling, stationary bike,    elliptical,         stair-stepper, swimming, etc ) or 25 minutes of high intensity           exercise 3      days per week  ·         If you need to lose weight or lower your blood pressure, your goal should         be 40 minutes or moderate to high intensity exercise 3 -4 times a week  ·         You can start this slowly with a few 10 minute sessions  ·         Adapt exercise routine to orthopedic limitations  ·         Stay aerobic  ·         You should be able to talk to the person next to you  Or, if alone, you    should be able to whistle or sing  ·         Remember some activity is better than none! ·         Start slow but keep it up! Examples of Moderate Intensity:  Walking briskly (3 miles per hour or faster, but not race-walking)   Water aerobics   Bicycling slower than 10 miles per hour   Tennis (doubles)   Ballroom dancing   General gardening                          Examples of Vigorous Intensity:  Race walking, jogging, or running   Swimming laps   Tennis (singles)   Aerobic dancing   Bicycling 10 miles per hour or faster   Jumping rope   Heavy gardening (continuous digging or hoeing)   Hiking uphill or with a heavy backpack     -Go to www heart  org [heart  org] for more tips

## 2022-07-19 ENCOUNTER — TELEPHONE (OUTPATIENT)
Dept: OBGYN CLINIC | Facility: CLINIC | Age: 32
End: 2022-07-19

## 2022-07-19 NOTE — TELEPHONE ENCOUNTER
Pt needs refill on oc's -Mely to [facility] 3 month supply; clinty r/s to 9/2/22 kristina/Jaqueline Villa

## 2022-09-01 NOTE — PROGRESS NOTES
Alireza Waite was seen today for gynecologic exam     Diagnoses and all orders for this visit:    Encounter for annual routine gynecological examination  ASCCP guidelines reviewed  Safe sex practices and condom use encouraged  SBE, daily exercise and healthy diet with adequate calcium and vitamin D encouraged  Weight bearing exercises a minimum of 150 minutes/week advised  Advised to call with any issues, all concerns & questions addressed  See provided information in your after visit summary     Encounter for surveillance of contraceptive pills  -     drospirenone-ethinyl estradiol (Mely 28) 3-0 03 MG per tablet; Take 1 tablet by mouth daily    Menorrhagia with regular cycle  Discussed normal fluctuation in menstrual cycles and lifestyle effects on cycles  Discussed watchful waiting of cycles  Advised patient to try prophylactic NSAID therapy to reduce bleeding and cramping  Discussed alternative BCM to alleviate heavy cycles including switching to a different COCP and progesterone only methods (Nexplanon implant, LNG-IUD, depo-provera Inj)  Written information provided on LNG-IUD and implant  She will consider  Follow up in 3-6 months if no improvement in cycles with NSAIDs  Health Maintenance:    Last PAP: 05/26/2020  Results were: Negative cytology  Next PAP Due: 05/26/2023    Gardasil Vaccine Series: She has had the Gardasil series  Subjective    CC: Yearly Exam     Morgan Phan is a 33 y o  female Misbah Moya,  here for an annual exam     GYN hx includes: Denies history of abnormal pap smears  Her menstrual cycles are regular every 28-30 days  Periods typically last 5 days with heavier flow on days 1-3  The last two months she has had heavier than normal periods with more cramping  Denies changes to her diet or exercise routine, major stressors or changes in her life, or recent vaccinations       She denies breast concerns, abnormal vaginal discharge, vaginal itching, odor, irritation, bowel/bladder dysfunction, urinary symptoms, pelvic pain, or dyspareunia today  She is sexually active  Her current method of contraception includes combination OCPs  Denies any issues with her BCM  Denies intimate partner violence  STD testing:  She does not want STD testing today  Patient's last menstrual period was 08/26/2022 (exact date)      Past Medical History:   Diagnosis Date    Allergic rhinitis     Costochondritis 5/30/2018    Dyspnea on exertion 5/30/2018    Obesity     Oral contraceptive use     Plantar wart of right foot 5/29/2019     Past Surgical History:   Procedure Laterality Date    MOUTH SURGERY  02/2022    WISDOM TOOTH EXTRACTION         Immunization History   Administered Date(s) Administered    COVID-19 PFIZER VACCINE 0 3 ML IM 05/11/2021, 06/03/2021    Influenza, injectable, quadrivalent, preservative free 0 5 mL 09/19/2018, 10/10/2019, 10/13/2021    Tdap 07/06/2020       Family History   Problem Relation Age of Onset    Hypertension Mother     Dementia Father     Diabetes Father     Ovarian cancer Neg Hx     Breast cancer Neg Hx     Uterine cancer Neg Hx     Cervical cancer Neg Hx      Social History     Tobacco Use    Smoking status: Never Smoker    Smokeless tobacco: Never Used   Vaping Use    Vaping Use: Never used   Substance Use Topics    Alcohol use: Yes     Comment: Social    Drug use: No       Current Outpatient Medications:     buPROPion (WELLBUTRIN XL) 300 mg 24 hr tablet, TAKE 1 TABLET BY MOUTH EVERY DAY, Disp: 90 tablet, Rfl: 1    drospirenone-ethinyl estradiol (Mely 28) 3-0 03 MG per tablet, Take 1 tablet by mouth daily, Disp: 84 tablet, Rfl: 3    loratadine (CLARITIN) 10 mg tablet, Take 10 mg by mouth daily, Disp: , Rfl:     traZODone (DESYREL) 50 mg tablet, TAKE 1 TABLET BY MOUTH DAILY AT BEDTIME AS NEEDED FOR SLEEP , Disp: 90 tablet, Rfl: 1    Omega-3 Fatty Acids (Fish Oil Burp-Less) 1000 MG CAPS, Take 2 capsules (2,000 mg total) by mouth in the morning (Patient not taking: Reported on 2022), Disp: 180 capsule, Rfl: 1  Patient Active Problem List    Diagnosis Date Noted    Depression, recurrent ([place] [id number] ) 2021    Reactive depression 2020    BMI 45 0-49 9, adult ([facility] ) 2019    Foot arch pain 2019    Pes planus of left foot 2019    Abnormal menses 2018    Allergic rhinitis 2018       Allergies   Allergen Reactions    Pollen Extract Allergic Rhinitis       OB History    Para Term  AB Living   0 0 0 0 0 0   SAB IAB Ectopic Multiple Live Births   0 0 0 0 0       Vitals:    22 1357   BP: 110/86   BP Location: Left arm   Patient Position: Sitting   Cuff Size: Large   Weight: (!) 139 kg (307 lb)   Height: 5' 8" (1 727 m)     Body mass index is 46 68 kg/m²  Review of Systems   Constitutional: Negative for chills, fatigue, fever and unexpected weight change  Respiratory: Negative for cough and shortness of breath  Cardiovascular: Negative for chest pain, palpitations and leg swelling  Gastrointestinal: Negative for abdominal distention, abdominal pain, blood in stool, constipation, diarrhea and nausea  Endocrine: Negative for cold intolerance and heat intolerance  Genitourinary: Positive for menstrual problem  Negative for difficulty urinating, dyspareunia, dysuria, frequency, hematuria, pelvic pain, urgency, vaginal bleeding, vaginal discharge and vaginal pain  Musculoskeletal: Negative for back pain  Skin: Negative for rash  Neurological: Negative for headaches  Psychiatric/Behavioral: Negative for confusion and dysphoric mood  All other systems reviewed and are negative  Physical Exam  Constitutional:       General: She is not in acute distress  Appearance: Normal appearance  She is obese  She is not ill-appearing  Genitourinary:      Bladder and urethral meatus normal       No lesions in the vagina        Right Labia: No rash, tenderness, lesions, skin changes or Bartholin's cyst      Left Labia: No tenderness, lesions, skin changes, Bartholin's cyst or rash  No inguinal adenopathy present in the right or left side  No vaginal discharge, erythema, tenderness, bleeding or ulceration  Right Adnexa: not tender, not full and no mass present  Left Adnexa: not tender, not full and no mass present  Cervix is nulliparous  No cervical motion tenderness, discharge, friability, lesion, polyp or nabothian cyst       Uterus is not enlarged, fixed or tender  No uterine mass detected  No urethral tenderness, mass or discharge present  Pelvic exam was performed with patient in the lithotomy position  Breasts:      Right: No swelling, inverted nipple, mass, nipple discharge, skin change, tenderness, axillary adenopathy or supraclavicular adenopathy  Left: No swelling, inverted nipple, mass, nipple discharge, skin change, tenderness, axillary adenopathy or supraclavicular adenopathy  HENT:      Head: Normocephalic and atraumatic  Eyes:      Conjunctiva/sclera: Conjunctivae normal    Cardiovascular:      Rate and Rhythm: Normal rate and regular rhythm  Pulmonary:      Effort: Pulmonary effort is normal       Breath sounds: Normal breath sounds  Abdominal:      General: There is no distension  Palpations: Abdomen is soft  Tenderness: There is no abdominal tenderness  Musculoskeletal:         General: Normal range of motion  Cervical back: Normal range of motion and neck supple  Lymphadenopathy:      Upper Body:      Right upper body: No supraclavicular or axillary adenopathy  Left upper body: No supraclavicular or axillary adenopathy  Lower Body: No right inguinal adenopathy  No left inguinal adenopathy  Neurological:      Mental Status: She is alert and oriented to person, place, and time  Skin:     General: Skin is warm and dry     Psychiatric:         Mood and Affect: Mood normal  Behavior: Behavior normal          Thought Content: Thought content normal          Judgment: Judgment normal    Vitals and nursing note reviewed

## 2022-09-01 NOTE — PROGRESS NOTES
Patient is here today for a gynecological annual exam    Heavy periods for the past 2 months  LMP: 2022   Menarche age: 16    BC: Pill    Last pap: 2020     Hx of abnormal paps? No    Gardasil: Series completed around age 13  UTD with Covid vaccine

## 2022-09-02 ENCOUNTER — ANNUAL EXAM (OUTPATIENT)
Dept: OBGYN CLINIC | Facility: CLINIC | Age: 32
End: 2022-09-02
Payer: COMMERCIAL

## 2022-09-02 VITALS
DIASTOLIC BLOOD PRESSURE: 86 MMHG | BODY MASS INDEX: 44.41 KG/M2 | WEIGHT: 293 LBS | SYSTOLIC BLOOD PRESSURE: 110 MMHG | HEIGHT: 68 IN

## 2022-09-02 DIAGNOSIS — Z01.419 ENCOUNTER FOR ANNUAL ROUTINE GYNECOLOGICAL EXAMINATION: Primary | ICD-10-CM

## 2022-09-02 DIAGNOSIS — Z30.41 ENCOUNTER FOR SURVEILLANCE OF CONTRACEPTIVE PILLS: ICD-10-CM

## 2022-09-02 DIAGNOSIS — N92.0 MENORRHAGIA WITH REGULAR CYCLE: ICD-10-CM

## 2022-09-02 PROCEDURE — 0503F POSTPARTUM CARE VISIT: CPT

## 2022-09-02 PROCEDURE — 99395 PREV VISIT EST AGE 18-39: CPT

## 2022-09-02 RX ORDER — DROSPIRENONE AND ETHINYL ESTRADIOL 0.03MG-3MG
1 KIT ORAL DAILY
Qty: 84 TABLET | Refills: 3 | Status: SHIPPED | OUTPATIENT
Start: 2022-09-02

## 2022-09-02 NOTE — PATIENT INSTRUCTIONS
Prophylactic NSAID therapy for Painful or Heavy menses     Ibuprofen or Naproxen (chose 1 or the other, do not take both), Dose as noted on the box  Typically Ibuprofen dose is 600 mg, (3 tablets) every 6-8 hours  Typically Naproxen dose is 500 mg every 12 hours  Start taking medication 2 days prior to onset of menses and continue taking through the first 3 days of menses  Make sure you take consistently this is important  You need to take with food to decrease any gastrointestinal upset effects    This is proven therapy to reduce you flow and cramping by 50 %    Life style changes that have a positive effect on painful and heavy periods are as follows   Daily physical exercise    Increase fiber, fresh fruits and vegetables in your diet    Increase daily water intake    Heating pads(do not apply directly to skin, apply over clothing or towel)   Warm Baths   Relaxation techniques, meditation, massage, yoga and mindfulness     These are all suggestion for improving your sense of frustrations with your menstrual cycle and improving your overall wellness and lifestyle     Breast Self Exam for Women   AMBULATORY CARE:   A breast self-exam (BSE)  is a way to check your breasts for lumps and other changes  Regular BSEs can help you know how your breasts normally look and feel  Most breast lumps or changes are not cancer, but you should always have them checked by a healthcare provider  Why you should do a BSE:  Breast cancer is the most common type of cancer in women  Even if you have mammograms, you may still want to do a BSE regularly  If you know how your breasts normally feel and look, it may help you know when to contact your healthcare provider  Mammograms can miss some cancers  You may find a lump during a BSE that did not show up on a mammogram   When you should do a BSE:  If you have periods, you may want to do your BSE 1 week after your period ends   This is the time when your breasts may be the least swollen, lumpy, or tender  You can do regular BSEs even if you are breastfeeding or have breast implants  Call your doctor if:   You find any lumps or changes in your breasts  You have breast pain or fluid coming from your nipples  You have questions or concerns about your condition or care  How to do a BSE:       Look at your breasts in a mirror  Look at the size and shape of each breast and nipple  Check for swelling, lumps, dimpling, scaly skin, or other skin changes  Look for nipple changes, such as a nipple that is painful or beginning to pull inward  Gently squeeze both nipples and check to see if fluid (that is not breast milk) comes out of them  If you find any of these or other breast changes, contact your healthcare provider  Check your breasts while you sit or  the following 3 positions:    Samir your arms down at your sides  Raise your hands and join them behind your head  Put firm pressure with your hands on your hips  Bend slightly forward while you look at your breasts in the mirror  Lie down and feel your breasts  When you lie down, your breast tissue spreads out evenly over your chest  This makes it easier for you to feel for lumps and anything that may not be normal for your breasts  Do a BSE on one breast at a time  Place a small pillow or towel under your left shoulder  Put your left arm behind your head  Use the 3 middle fingers of your right hand  Use your fingertip pads, on the top of your fingers  Your fingertip pad is the most sensitive part of your finger  Use small circles to feel your breast tissue  Use your fingertip pads to make dime-sized, overlapping circles on your breast and armpits  Use light, medium, and firm pressure  First, press lightly  Second, press with medium pressure to feel a little deeper into the breast  Last, use firm pressure to feel deep within your breast     Examine your entire breast area    Examine the breast area from above the breast to below the breast where you feel only ribs  Make small circles with your fingertips, starting in the middle of your armpit  Make circles going up and down the breast area  Continue toward your breast and all the way across it  Examine the area from your armpit all the way over to the middle of your chest (breastbone)  Stop at the middle of your chest     Move the pillow or towel to your right shoulder, and put your right arm behind your head  Use the 3 fingertip pads of your left hand, and repeat the above steps to do a BSE on your right breast     What else you can do to check for breast problems or cancer:  Talk to your healthcare provider about mammograms  A mammogram is an x-ray of your breasts to screen for breast cancer or other problems  Your provider can tell you the benefits and risks of mammograms  The first mammogram is usually at age 39 or 48  Your provider may recommend you start at 36 or younger if your risk for breast cancer is high  Mammograms usually continue every 1 to 2 years until age 76  Follow up with your doctor as directed:  Write down your questions so you remember to ask them during your visits  © Copyright [employer] 2022 Information is for End User's use only and may not be sold, redistributed or otherwise used for commercial purposes  All illustrations and images included in CareNotes® are the copyrighted property of [employer]  or Luly Avila  The above information is an  only  It is not intended as medical advice for individual conditions or treatments  Talk to your doctor, nurse or pharmacist before following any medical regimen to see if it is safe and effective for you

## 2022-09-20 ENCOUNTER — OFFICE VISIT (OUTPATIENT)
Dept: FAMILY MEDICINE CLINIC | Facility: CLINIC | Age: 32
End: 2022-09-20
Payer: COMMERCIAL

## 2022-09-20 VITALS
DIASTOLIC BLOOD PRESSURE: 80 MMHG | TEMPERATURE: 99.1 F | HEART RATE: 105 BPM | SYSTOLIC BLOOD PRESSURE: 120 MMHG | WEIGHT: 293 LBS | HEIGHT: 68 IN | BODY MASS INDEX: 44.41 KG/M2 | OXYGEN SATURATION: 98 %

## 2022-09-20 DIAGNOSIS — R09.81 NASAL CONGESTION: ICD-10-CM

## 2022-09-20 DIAGNOSIS — J34.89 SINUS PRESSURE: ICD-10-CM

## 2022-09-20 DIAGNOSIS — J01.00 ACUTE NON-RECURRENT MAXILLARY SINUSITIS: ICD-10-CM

## 2022-09-20 DIAGNOSIS — B34.9 VIRAL ILLNESS: Primary | ICD-10-CM

## 2022-09-20 DIAGNOSIS — J02.9 SORE THROAT: ICD-10-CM

## 2022-09-20 LAB
SARS-COV-2 AG UPPER RESP QL IA: NEGATIVE
VALID CONTROL: NORMAL

## 2022-09-20 PROCEDURE — 87811 SARS-COV-2 COVID19 W/OPTIC: CPT | Performed by: NURSE PRACTITIONER

## 2022-09-20 PROCEDURE — 99213 OFFICE O/P EST LOW 20 MIN: CPT | Performed by: NURSE PRACTITIONER

## 2022-09-20 RX ORDER — PREDNISONE 10 MG/1
TABLET ORAL
Qty: 30 TABLET | Refills: 0 | Status: SHIPPED | OUTPATIENT
Start: 2022-09-20

## 2022-09-20 RX ORDER — FLUTICASONE PROPIONATE 50 MCG
2 SPRAY, SUSPENSION (ML) NASAL 2 TIMES DAILY
Qty: 16 G | Refills: 0 | Status: SHIPPED | OUTPATIENT
Start: 2022-09-20 | End: 2022-10-12

## 2022-09-20 RX ORDER — FLUCONAZOLE 150 MG/1
150 TABLET ORAL ONCE
Qty: 1 TABLET | Refills: 0 | Status: SHIPPED | OUTPATIENT
Start: 2022-09-20 | End: 2022-09-20

## 2022-09-20 RX ORDER — AMOXICILLIN 875 MG/1
875 TABLET, COATED ORAL 2 TIMES DAILY
Qty: 14 TABLET | Refills: 0 | Status: SHIPPED | OUTPATIENT
Start: 2022-09-20 | End: 2022-09-27

## 2022-09-20 NOTE — PROGRESS NOTES
Assessment/Plan:    Problem List Items Addressed This Visit    None     Visit Diagnoses     Viral illness    -  Primary    Relevant Orders    POCT Rapid Covid Ag (Completed)    Nasal congestion        Relevant Orders    POCT Rapid Covid Ag (Completed)    Sinus pressure        Relevant Medications    fluticasone (FLONASE) 50 mcg/act nasal spray    Misc Natural Product Nasal (Nasal Cleanse Rinse Mix) PACK    amoxicillin (AMOXIL) 875 mg tablet    predniSONE 10 mg tablet    fluconazole (DIFLUCAN) 150 mg tablet    Other Relevant Orders    POCT Rapid Covid Ag (Completed)    Sore throat        Relevant Orders    POCT Rapid Covid Ag (Completed)    Acute non-recurrent maxillary sinusitis        Relevant Medications    fluticasone (FLONASE) 50 mcg/act nasal spray    Misc Natural Product Nasal (Nasal Cleanse Rinse Mix) PACK    amoxicillin (AMOXIL) 875 mg tablet    predniSONE 10 mg tablet    fluconazole (DIFLUCAN) 150 mg tablet           Diagnoses and all orders for this visit:    Viral illness  -     POCT Rapid Covid Ag    Nasal congestion  -     POCT Rapid Covid Ag    Sinus pressure  -     POCT Rapid Covid Ag  -     fluticasone (FLONASE) 50 mcg/act nasal spray; 2 sprays into each nostril 2 (two) times a day  -     Misc Natural Product Nasal (Nasal Cleanse Rinse Mix) PACK; 1 spray into each nostril 2 (two) times a day as needed (nasal irriagtion) for up to 7 days  -     amoxicillin (AMOXIL) 875 mg tablet; Take 1 tablet (875 mg total) by mouth 2 (two) times a day for 7 days  -     predniSONE 10 mg tablet; 4 tablets for 3 days, 3 tablets for 3 days, 2 tablets for 3 days, 1 tablet for 3 days, then D/C   -     fluconazole (DIFLUCAN) 150 mg tablet;  Take 1 tablet (150 mg total) by mouth once for 1 dose Take the day after antibiotics are gone    Sore throat  -     POCT Rapid Covid Ag    Acute non-recurrent maxillary sinusitis  -     fluticasone (FLONASE) 50 mcg/act nasal spray; 2 sprays into each nostril 2 (two) times a day  - Misc Natural Product Nasal (Nasal Cleanse Rinse Mix) PACK; 1 spray into each nostril 2 (two) times a day as needed (nasal irriagtion) for up to 7 days  -     amoxicillin (AMOXIL) 875 mg tablet; Take 1 tablet (875 mg total) by mouth 2 (two) times a day for 7 days  -     predniSONE 10 mg tablet; 4 tablets for 3 days, 3 tablets for 3 days, 2 tablets for 3 days, 1 tablet for 3 days, then D/C   -     fluconazole (DIFLUCAN) 150 mg tablet; Take 1 tablet (150 mg total) by mouth once for 1 dose Take the day after antibiotics are gone      No problem-specific Assessment & Plan notes found for this encounter  Subjective:      Patient ID: Marcelo Posadas is a 33 y o  female  Presents with c/o URI symptoms, sinus pressure, tooth pain, sore throat, and nasal conjestion  COVID Ag at home x 1 was negative - will repeat  Office Visit on 09/20/2022  POCT SARS-CoV-2 Ag                            Date: 09/20/2022  Value: Negative    Ref range: Negative           Status: Final  VALID CONTROL                                 Date: 09/20/2022  Value: Valid         Status: Final  ------------      Sinusitis  There has been no fever  The pain is moderate  Associated symptoms include congestion, sinus pressure and a sore throat  Past treatments include saline sprays  The treatment provided no relief  The following portions of the patient's history were reviewed and updated as appropriate:   She has a past medical history of Allergic rhinitis, Costochondritis (5/30/2018), Dyspnea on exertion (5/30/2018), Obesity, Oral contraceptive use, and Plantar wart of right foot (5/29/2019)  ,  does not have any pertinent problems on file  ,   has a past surgical history that includes [place] tooth extraction and Mouth surgery (02/2022)  ,  family history includes Dementia in her father; Diabetes in her father; Hypertension in her mother  ,   reports that she has never smoked   She has never used smokeless tobacco  She reports current alcohol use  She reports that she does not use drugs  ,  is allergic to pollen extract     Current Outpatient Medications   Medication Sig Dispense Refill    amoxicillin (AMOXIL) 875 mg tablet Take 1 tablet (875 mg total) by mouth 2 (two) times a day for 7 days 14 tablet 0    buPROPion (WELLBUTRIN XL) 300 mg 24 hr tablet TAKE 1 TABLET BY MOUTH EVERY DAY 90 tablet 1    drospirenone-ethinyl estradiol (Mely 28) 3-0 03 MG per tablet Take 1 tablet by mouth daily 84 tablet 3    fluconazole (DIFLUCAN) 150 mg tablet Take 1 tablet (150 mg total) by mouth once for 1 dose Take the day after antibiotics are gone 1 tablet 0    fluticasone (FLONASE) 50 mcg/act nasal spray 2 sprays into each nostril 2 (two) times a day 16 g 0    Misc Natural Product Nasal (Nasal Cleanse Rinse Mix) PACK 1 spray into each nostril 2 (two) times a day as needed (nasal irriagtion) for up to 7 days 1 each 0    predniSONE 10 mg tablet 4 tablets for 3 days, 3 tablets for 3 days, 2 tablets for 3 days, 1 tablet for 3 days, then D/C  30 tablet 0    traZODone (DESYREL) 50 mg tablet TAKE 1 TABLET BY MOUTH DAILY AT BEDTIME AS NEEDED FOR SLEEP  90 tablet 1    loratadine (CLARITIN) 10 mg tablet Take 10 mg by mouth daily (Patient not taking: Reported on 9/20/2022)      Omega-3 Fatty Acids (Fish Oil Burp-Less) 1000 MG CAPS Take 2 capsules (2,000 mg total) by mouth in the morning (Patient not taking: No sig reported) 180 capsule 1     No current facility-administered medications for this visit  Review of Systems   HENT: Positive for congestion, postnasal drip, sinus pressure and sore throat  All other systems reviewed and are negative  Objective:  Vitals:    09/20/22 1207   BP: 120/80   BP Location: Left arm   Patient Position: Sitting   Cuff Size: Adult   Pulse: 105   Temp: 99 1 °F (37 3 °C)   TempSrc: Tympanic   SpO2: 98%   Weight: (!) 139 kg (306 lb)   Height: 5' 8" (1 727 m)     Body mass index is 46 53 kg/m²       Physical Exam  Vitals and nursing note reviewed  Constitutional:       Appearance: Normal appearance  She is well-developed  Interventions: Face mask in place  HENT:      Head: Normocephalic and atraumatic  Nose:      Right Sinus: Maxillary sinus tenderness present  Left Sinus: Maxillary sinus tenderness present  Mouth/Throat:      Mouth: Mucous membranes are moist       Pharynx: Uvula midline  Eyes:      General: Lids are normal       Conjunctiva/sclera: Conjunctivae normal       Pupils: Pupils are equal, round, and reactive to light  Neck:      Thyroid: No thyroid mass or thyromegaly  Vascular: No JVD  Trachea: Trachea and phonation normal    Cardiovascular:      Rate and Rhythm: Normal rate and regular rhythm  Heart sounds: Normal heart sounds, S1 normal and S2 normal  No murmur heard  No friction rub  No gallop  Pulmonary:      Effort: Pulmonary effort is normal       Breath sounds: Normal breath sounds  Abdominal:      General: Bowel sounds are normal       Palpations: Abdomen is soft  Tenderness: There is no abdominal tenderness  Genitourinary:     Comments: Deferred   Musculoskeletal:      Cervical back: Full passive range of motion without pain and normal range of motion  Lymphadenopathy:      Head:      Right side of head: No submental, submandibular, tonsillar, preauricular, posterior auricular or occipital adenopathy  Left side of head: No submental, submandibular, tonsillar, preauricular, posterior auricular or occipital adenopathy  Skin:     General: Skin is warm and dry  Capillary Refill: Capillary refill takes less than 2 seconds  Neurological:      General: No focal deficit present  Mental Status: She is alert and oriented to person, place, and time  Cranial Nerves: Cranial nerves are intact  No cranial nerve deficit  Sensory: Sensation is intact  Motor: Motor function is intact        Coordination: Coordination is intact  Gait: Gait is intact  Deep Tendon Reflexes: Reflexes are normal and symmetric  Psychiatric:         Attention and Perception: Attention and perception normal          Mood and Affect: Mood and affect normal          Speech: Speech normal          Behavior: Behavior normal  Behavior is cooperative  Thought Content:  Thought content normal          Cognition and Memory: Cognition normal          Judgment: Judgment normal

## 2022-10-12 DIAGNOSIS — J34.89 SINUS PRESSURE: ICD-10-CM

## 2022-10-12 DIAGNOSIS — J01.00 ACUTE NON-RECURRENT MAXILLARY SINUSITIS: ICD-10-CM

## 2022-10-12 RX ORDER — FLUTICASONE PROPIONATE 50 MCG
SPRAY, SUSPENSION (ML) NASAL
Qty: 48 ML | Refills: 1 | Status: SHIPPED | OUTPATIENT
Start: 2022-10-12

## 2022-10-31 DIAGNOSIS — J34.89 SINUS PRESSURE: ICD-10-CM

## 2022-10-31 DIAGNOSIS — J01.00 ACUTE NON-RECURRENT MAXILLARY SINUSITIS: ICD-10-CM

## 2022-10-31 RX ORDER — FLUTICASONE PROPIONATE 50 MCG
SPRAY, SUSPENSION (ML) NASAL
Qty: 48 ML | Refills: 2 | Status: SHIPPED | OUTPATIENT
Start: 2022-10-31

## 2022-12-07 DIAGNOSIS — F33.9 DEPRESSION, RECURRENT (HCC): ICD-10-CM

## 2022-12-07 RX ORDER — BUPROPION HYDROCHLORIDE 300 MG/1
TABLET ORAL
Qty: 90 TABLET | Refills: 1 | Status: SHIPPED | OUTPATIENT
Start: 2022-12-07